# Patient Record
Sex: FEMALE | Race: WHITE | NOT HISPANIC OR LATINO | Employment: UNEMPLOYED | URBAN - METROPOLITAN AREA
[De-identification: names, ages, dates, MRNs, and addresses within clinical notes are randomized per-mention and may not be internally consistent; named-entity substitution may affect disease eponyms.]

---

## 2018-01-15 NOTE — PROGRESS NOTES
Message    Type of Encounter: Telephonic    Spoke to Patient  The reason for call is to discuss outreach for follow up/needed services, coordination of meeting care plan treatment goals and results  returned pt's phone call from 3/31/16 + preg test and [facility]  She had started her care with us for last pregnancy due date of 4/15 and ended up transferring to [facility]  I informed dr Jim Mcclure she suggested she go back to [facility]  She said she ended up with bad experience, since they promised a  and she ended up C/S   She was referred to Dr Fernando Wang to discuss and see if he will care for her          Signatures   Electronically signed by : KIP Guo ; 2016  3:24PM EST

## 2018-01-15 NOTE — PROGRESS NOTES
2016         RE: Michelle Bautista                           To: KIP Chavez    MR#: [id number]   : [address]: [id number]                             Fax: [phone]   (Exam #: [id number])      The LMP of this 32year old,  3, para 2 patient was 2015,   her working MARKY is SEP 29 2016 and the current gestational age is 12 weeks   1 day by [address] [place] [address]  A sonographic examination was performed on   2016 using real time equipment  The ultrasound examination was   performed using abdominal technique  The patient has a BMI of 24 6  Her   blood pressure today was 127/69  Earliest ultrasound found in her record: MFM 16  16w1d  MARKY 16      Thank you very much for your kind referral of Michelle Michele to the   [address] [place] [address] in [place] on 2016 for fetal ultrasound   evaluation and  assessment  Ave Camacho is a 20-year-old white female    3 para 3404 with uncertain menstrual dating  She has no   complaints  She denies vaginal bleeding  Her prenatal course has been   unremarkable so far  Jose A Mcdonald has a history of 2 prior  sections at term, the most   recent in 2015  Her first pregnancy was uncomplicated   prenatally  Her second pregnancy was complicated by diet-controlled   gestational diabetes  She delivered appropriately grown baby boys each   currently healthy  Daryl Coulter has an unremarkable past medical history, with   the exception of hypothyroidism, currently treated with Synthroid   replacement  Her past surgical history is otherwise negative  She takes no   other medication with the exception of a prenatal vitamin on a daily basis   and has no known drug allergy  Jose A Mcdonald denies tobacco, alcohol, or   illicit drug use during the pregnancy   The family medical history is   significant for her dad having hypertension, otherwise negative with   respect to first degree relatives with hypertension, diabetes, or venous   thromboembolism  The family genetic history is negative with respect to   genetic abnormalities, birth defects, or mental retardation  INDICATIONS      pregnancy dating      Exam Types      Level I      RESULTS      Fetus # 1 of 1   Transverse presentation   Fetal growth appeared normal   Placenta Location = Posterior   Complete placenta previa   Placenta Grade = 0      MEASUREMENTS (* Included In Average GA)      AC              10 3 cm        15 weeks 6 days* (56%)   BPD              3 3 cm        16 weeks 2 days*   HC              12 5 cm        16 weeks 1 day *   Femur            2 0 cm        16 weeks 0 days*      Tibia            1 8 cm        16 weeks 1 day   (46%)   Fibula           1 7 cm        15 weeks 4 days      Cerebellum       1 6 cm        16 weeks 6 days   CisternaMagna    3 2 mm      HC/AC           1 22   FL/AC           0 20   FL/BPD          0 61   EFW (Ac/Fl/Hc)   145 grams - 0 lbs 5 oz      THE AVERAGE GESTATIONAL AGE is 16 weeks 1 day +/- 7 days  AMNIOTIC FLUID      Largest Vertical Pocket = 3 0 cm   Amniotic Fluid: Normal      ANATOMY SUMMARY      The fetal cranium appeared normal in shape  Choroid plexus cysts are not   present  The lateral ventricles were not dilated and the midline   structures were not deviated  The cerebellum and cisterna magna were   visualized and appeared normal  The nuchal fold is not abnormally   thickened  The calvarium showed no evidence of defect, scalloping of the   parietal bones or abnormal shape  The cavum septum pellucidum appears   normal  There is no evidence of cleft lip or micrognathia  Anatomy of   the fetal thorax appeared within normal limits  The fetal diaphragm   appears intact  There were no intrathoracic masses noted or evidence of   pleural/pericardial effusion  The aortic arch appears normal  There was no   suspicion of tricuspid regurgitation   The cardiac size and structures   appeared sonographically normal at the four chamber view, and cardiac   rhythm was regular  There is no suspicion of fetal dysrhythmia  There was   no evidence of cardiomegaly, pericardial effusion or atrial/ventricular   disproportion  The interventricular septum appeared to be intact  There   is no suspicion of an echogenic intracardiac focus  The three vessel    tracheal view appears normal   The outflow tract views are normal  The   abdominal cavity appears normal  There is no evidence of fetal bowel   obstruction or abnormally echogenic bowel  Ascites is not present  The   fetal stomach appears normal in size and shape  The right kidney appears   normal  There is no suspicion of pyelectasis  Renal cysts are not   present  The echogenicity of the kidney is normal  The left kidney   appears normal   There is no suspicion of pyelectasis  The echogenicity   of the kidney is normal   renal cysts are not present  The fetal bladder   appears normal in size and shape  There is no suspicion of ureterocele  The abdominal wall appears intact  A normal abdominal cord insertion is   noted  The spine was visualized from cervical to sacral region, within the   resolution of the ultrasound equipment, without evidence of a neural tube   defect  or other malformation  Active movement of the extremities was seen   and fetal body motion was also observed during this examination  There was   no evidence of long bone abnormality   There is no evidence of a   subchorionic bleed  There is a 3 vessel cord with normal insertion site  The uterine contour appears normal  There is no suspicion of a uterine   myoma  ADNEXA      The left ovary appeared normal and measured 4 4 x 3 1 x 1 8 cm with a   volume of 12 8 cc  The right ovary appeared normal and measured 4 1 x 3 0   x 2 2 cm with a volume of 14 2 cc        UTERINE ARTERIES                                  S/D   PI    RI    NOTCH       Left Uterine Artery              0 93 No       Right Uterine Artery             2 23         Yes      IMPRESSION      Figueredo IUP   16 weeks and 1 day by this ultrasound  (MARKY=SEP 28 2016)   16 weeks and 1 day by 2nd Trimester Sono  (MARKY=SEP 28 2016)   Transverse presentation   Fetal growth appeared normal   Normal anatomy survey   Posterior placenta   Complete placenta previa      GENERAL COMMENT      No fetal structural abnormality or ultrasound marker for aneuploidy is   identified on the ultrasound study today in a limited evaluation secondary   to the early gestational age  The maternal uterine artery Doppler study is   abnormal  The amniotic fluid volume is normal  The estimated due date   based upon today's study is September 28, 2016  Placenta previa is present  Today's ultrasound findings and suggested follow-up were discussed in   detail with Chanell  The abnormal maternal uterine artery Doppler study   is associated with an increased risk for adverse pregnancy outcomes   related to abnormal placentation, including IUGR and preeclampsia  Daily   low dose aspirin therapy initiated at less than 16 weeks gestation is   associated with a significant reduction of these risks  I recommended   that Chanell initiate aspirin therapy this week  Darron Camarena will return to   the [facility]  in 4 weeks for level II ultrasound evaluation and   assessment of placental location  Given her history of prior gestational   diabetes, she has as much as a 50% likelihood for recurrence of GDM during   her current pregnancy  Screening should be performed soon, with repeat   evaluation between 24 and 28 weeks gestation, if initially negative  The face to face time, in addition to time spent discussing ultrasound   results, was 10 minutes, greater than 50% of which was spent during   counseling and coordination of care  DELFINO Dejesus M D     Maternal-Fetal Medicine   Electronically signed 04/14/16 14:06

## 2018-01-15 NOTE — PROGRESS NOTES
MAY 12 2016         RE: Lor Espino                           To: KIP Velasquez    MR#: [id number]   : [address]: [id number]                             Fax: [phone]   (Exam #: [id number])      The LMP of this 32year old,  G3, P2-0-0-2 patient was unknown, her   working MARKY is SEP 29 2016 and the current gestational age is 25 weeks 1   day by [address] [place] [address]  A sonographic examination was performed on MAY   12 2016 using real time equipment  The ultrasound examination was   performed using abdominal & vaginal techniques  The patient has a BMI of   24 7  Her blood pressure today was 121/77        Earliest ultrasound found in her record: MFM 16  16w1d  MARKY 16      Cardiac motion was observed at 141 bpm       INDICATIONS      fetal anatomical survey   abnormal uterine Doppler      Exam Types      LEVEL II   Transvaginal      RESULTS      Fetus # 1 of 1   Vertex presentation   Fetal growth appeared normal   Placenta Location = Posterior, low lying   No placenta previa   Placenta Grade = I      MEASUREMENTS (* Included In Average GA)      AC              14 1 cm        19 weeks 1 day * (35%)   BPD              4 4 cm        19 weeks 2 days* (34%)   HC              16 8 cm        19 weeks 3 days* (30%)   Femur            3 3 cm        20 weeks 3 days* (47%)      Nuchal Fold      4 0 mm      Humerus          3 1 cm        20 weeks 1 day   (58%)   Radius           2 6 cm        20 weeks 6 days   Ulna             3 0 cm        21 weeks 3 days   Tibia            2 8 cm        20 weeks 1 day   (47%)   Fibula           2 8 cm        19 weeks 4 days   Foot             3 3 cm        20 weeks 2 days      Cerebellum       2 0 cm        19 weeks 4 days   Biorbit          3 1 cm        19 weeks 6 days   CisternaMagna    6 0 mm      HC/AC           1 19   FL/AC           0 23   FL/BPD          0 74   EFW (Ac/Fl/Hc)   308 grams - 0 lbs 11 oz      THE AVERAGE GESTATIONAL AGE is 19 weeks 4 days +/- 10 days  AMNIOTIC FLUID         Largest Vertical Pocket = 3 9 cm   Amniotic Fluid: Normal      UTERINE ARTERIES                                  S/D   PI    RI    NOTCH       Left Uterine Artery        3 21  1 45  0 69       Right Uterine Artery       4 08  1 94  0 75      CERVICAL EVALUATION      The cervix appeared normal (Ultrasound Examination)  SUPINE      Cervical Length: 4 80 cm      OTHER TEST RESULTS           Funneling?: No             Dynamic Changes?: No        Resp  To TFP?: No      ANATOMY DETAILS      Visualized Appearing Sonographically Normal:   HEAD: (Calvarium, BPD Level, Lateral Ventricles, Choroid Plexus,   Cerebellum, Cisterna Magna);    FACE/NECK: (Neck, Nuchal Fold, Profile,   Orbits, Nose/Lips, Palate, Face);    TH  CAV : (Diaphragm); HEART: (3   Vessel Trachea, Four Chamber View, Proximal Left Outflow, Proximal Right   Outflow, Aortic Arch, Ductal Arch, Short Axis of Greater Vessels, Cardiac   Axis, Interventricular Septum, Interatrial Septum, Cardiac Position);      ABD  CAV , STOMACH, RIGHT KIDNEY, LEFT KIDNEY, BLADDER, ABD  WALL, SPINE:   (Cervical Spine, Thoracic Spine, Lumbar Spine, Sacrum);    EXTREMS: (Lt   Humerus, Rt Humerus, Lt Forearm, Rt Forearm, Lt Hand, Rt Hand, Lt Femur,   Rt Femur, Lt Low Leg, Rt Low Leg, Lt Foot, Rt Foot);    GENITALIA,   PLACENTA, UMBL  CORD, UTERUS, PCI      ADNEXA      The left ovary appeared normal and measured 4 1 x 2 5 x 2 8 cm with a   volume of 15 0 cc  The right ovary appeared normal and measured 3 9 x 4 4   x 2 5 cm with a volume of 22 4 cc        IMPRESSION      Figueredo IUP   19 weeks and 4 days by this ultrasound  (MARKY=OCT 2 2016)   20 weeks and 1 day by 2nd Trimester Sono  (MARKY=SEP 28 2016)   Vertex presentation   Fetal growth appeared normal   Normal anatomy survey   Regular fetal heart rate of 141 bpm   Posterior, low lying placenta   No placenta previa      GENERAL COMMENT      On exam today the patient appears well, in no acute distress, and denies   any complaints  Her abdomen is non-tender  The patient has not had genetic screening as she presented late to   prenatal care  She informs me that she may be interested in undergoing a   quad screen which I discussed with her can be performed up until 22 weeks   and the requisition can be obtained by the patient's obstetrician  The fetal anatomic survey is complete  There is no sonographic evidence   of fetal abnormalities at this time  Good fetal movement and tone are   seen  The amniotic fluid volume appears normal   The placenta is   posterior and is low-lying measuring 12 mm from the internal os  A   transvaginal ultrasound was performed to assess the cervix, which was not   seen well transabdominally  The cervical length was 4 8 centimeters,   which is normal for the current gestational age  There was no significant   funneling or dynamic changes appreciated  The patient was informed of   today's findings and all of her questions were answered  The limitations   of ultrasound were reviewed with the patient, which she accepts  Increased resistance indices are again appreciated in both uterine   arteries on today's ultrasound  This finding has been associated with   poor placental perfusion and intrauterine growth restriction  In light of   today's uterine artery findings, recommend continuation of low dose   aspirin therapy  A recent meta-analysis yielded risk reductions of 24%   for preeclampsia, 20% for intrauterine growth restriction, and 14% for    birth, with an absolute risk reduction of 2-5% for preeclampsia,   one to 5% for intrauterine growth restriction, and 2-4% for  birth  In this study, there was no identified risk of harm to the mother or   fetus but long-term evidence was somewhat limited    Given the overall   safety profile and risk-benefit analysis, I recommend an 81 mg aspirin be   taken everyday until approximately 35 weeks  Recommend the patient return in 8 weeks for a growth ultrasound for the   indication of abnormal uterine artery Dopplers and in order to check the   placenta location given the low-lying placenta  Please note, in addition to the time spent discussing the results of the   ultrasound, I spent approximately 10 minutes of face-to-face time with the   patient, greater than 50% of which was spent in counseling and the   coordination of care for this patient  Thank you very much for allowing us to participate in the care of this   very nice patient  Should you have any questions, please do not hesitate   to contact our office  DELFINO David M D     Electronically signed 05/12/16 13:45

## 2018-01-17 NOTE — PROGRESS NOTES
AUG 8 2016         RE: Vin Moore                           To: KIP Steel    MR#: [id number]   : [address]: [id number]                             Fax: [phone]   (Exam #: [id number])      The LMP of this 32year old,  G3, P2-0-0-2 patient was unknown, her   working MARKY is SEP 29 2016 and the current gestational age is 26 weeks 5   days by [address] [place] [address]  A sonographic examination was performed on AUG   8 2016 using real time equipment  The ultrasound examination was performed   using abdominal technique  The patient has a BMI of 26 4  Her blood   pressure today was 115/66  Earliest ultrasound found in her record: MFM 16  16w1d  MARKY 16      Cardiac motion was observed at 136 bpm       INDICATIONS      Interval growth assesment   abnormal uterine Doppler   diabetes, gestational, A1      Exam Types      Level I      RESULTS      Fetus # 1 of 1   Vertex presentation   Fetal growth appeared normal   Placenta Location = Posterior, left lateral   No placenta previa   Placenta Grade = II      MEASUREMENTS (* Included In Average GA)      AC              27 6 cm        31 weeks 5 days* (29%)   BPD              8 0 cm        32 weeks 1 day * (33%)   HC              29 3 cm        32 weeks 0 days* (21%)   Femur            6 4 cm        32 weeks 6 days* (55%)      Cerebellum       4 1 cm        34 weeks 1 day      HC/AC           1 06   FL/AC           0 23   FL/BPD          0 80   EFW (Ac/Fl/Hc)  1917 grams - 4 lbs 4 oz                 (36%)      THE AVERAGE GESTATIONAL AGE is 32 weeks 1 day +/- 18 days        AMNIOTIC FLUID      Q1: 5 7      Q2: 4 9      Q3: 5 8      Q4: 4 4   HEATHER Total = 20 7 cm   Amniotic Fluid: Normal      ANATOMY DETAILS      Visualized Appearing Sonographically Normal:   HEAD: (Calvarium, BPD Level, Cavum, Lateral Ventricles, Choroid Plexus,   Cerebellum, Cisterna Magna);    TH  CAV : (Diaphragm);    ABD  CAV ,   STOMACH, RIGHT KIDNEY, LEFT KIDNEY, BLADDER, PLACENTA      Not Visualized:   HEART: (Four Chamber View, Proximal Left Outflow, Proximal Right Outflow,   Cardiac Axis, Cardiac Position)      IMPRESSION      Figueredo IUP   32 weeks and 1 day by this ultrasound  (MARKY=OCT 2 2016)   32 weeks and 5 days by 2nd Trimester Sono  (MARKY=SEP 28 2016)   Vertex presentation   Fetal growth appeared normal   Regular fetal heart rate of 136 bpm   Posterior, left lateral placenta   No placenta previa      GENERAL COMMENT      On exam today the patient appears well, in no acute distress, and denies   any complaints  Her abdomen is non-tender  There has been appropriate interval fetal growth  Good fetal movement and   tone are seen  The amniotic fluid volume appears normal   The placenta is   [default value]and it appears sonographically normal   The anatomic   structures listed above could not be optimally imaged today because of   fetal position; however, these structures were seen on a prior ultrasound   and appeared sonographically normal   The patient was informed of today's   findings and all of her questions were answered  The limitations of   ultrasound were reviewed with the patient   labor precautions and   fetal kick counts were reviewed  The patient is an A1 gestational diabetic   surveillance is not   required for this group of patients as long as there is appropriate fetal   growth and normal glucose values  The patient was informed of today's   findings and all of her questions were answered  Recommend that the   patient be delivered by 41  weeks gestation; however, if delivery is   delayed beyond 40 weeks gestation,  testing should be initiated   at that time  We discussed follow-up in detail and I recommend the patient return in 4   weeks for a growth ultrasound  Thank you very much for allowing us to participate in the care of this   very nice patient    Should you have any questions, please do not hesitate   to contact our office  Please note, in addition to the time spent discussing the results of the   ultrasound, I spent approximately 10 minutes of face-to-face time with the   patient, greater than 50% of which was spent in counseling and the   coordination of care for this patient  DELFINO Lemos M D     Electronically signed 08/08/16 14:47

## 2018-01-17 NOTE — PROGRESS NOTES
2016         RE: Yanet Kirk                           To: Shawnee Zhong, M D    MR#: [id number]   : [address]: [id number]                             Fax: [phone]   (Exam #: [id number])      The LMP of this 32year old,  G3, P2-0-0-2 patient was unknown, her   working MARKY is SEP 29 2016 and the current gestational age is 35 weeks 1   day by [address] [place] [address] [address]  A sonographic examination was performed on 2016 using real time equipment  The ultrasound examination was performed   using abdominal & vaginal techniques  The patient has a BMI of 24 9  Her   blood pressure today was 114/75  Earliest ultrasound found in her record: MFM 16  16w1d  MARKY 16      Cardiac motion was observed at 138 bpm       INDICATIONS      Interval growth assesment   abnormal uterine Doppler   low lying placenta      Exam Types      Level I   Transvaginal      RESULTS      Fetus # 1 of 1   Vertex presentation   Fetal growth appeared normal   Placenta Location = Posterior   No placenta previa   Placenta Grade = II      MEASUREMENTS (* Included In Average GA)      AC              22 7 cm        27 weeks 0 days* (29%)   BPD              6 8 cm        27 weeks 3 days* (27%)   HC              25 4 cm        27 weeks 2 days* (22%)   Femur            5 4 cm        28 weeks 6 days* (52%)      Cerebellum       3 2 cm        28 weeks 4 days      HC/AC           1 12   FL/AC           0 24   FL/BPD          0 80   EFW (Ac/Fl/Hc)  1119 grams - 2 lbs 7 oz                 (43%)      THE AVERAGE GESTATIONAL AGE is 27 weeks 4 days +/- 14 days  AMNIOTIC FLUID      Q1: 2 2      Q2: 3 5      Q3: 3 3      Q4: 3 7   HEATHER Total = 12 6 cm   Amniotic Fluid: Normal      CERVICAL EVALUATION      The cervix appeared normal (Ultrasound Examination)  SUPINE      Cervical Length: 4 28 cm      OTHER TEST RESULTS           Funneling?: No             Dynamic Changes?: No        Resp   To TFP?: No ANATOMY      Head                                    Normal   Th  Cav  Normal   Heart                                   See Details   Abd  Cav  Normal   Stomach                                 Normal   Right Kidney                            Normal   Left Kidney                             Normal   Bladder                                 Normal   Placenta                                Normal      ANATOMY DETAILS      Visualized Appearing Sonographically Normal:   HEAD: (Calvarium, BPD Level, Cavum, Lateral Ventricles, Choroid Plexus,   Cerebellum, Cisterna Magna);    TH  CAV : (Diaphragm); HEART: (Four   Chamber View, Proximal Left Outflow, Proximal Right Outflow, 3 Vessel   Trachea, Short Axis of Greater Vessels, Interventricular Septum,   Interatrial Septum, Cardiac Axis, Cardiac Position);    ABD  CAV ,   STOMACH, RIGHT KIDNEY, LEFT KIDNEY, BLADDER, PLACENTA      IMPRESSION      Figueredo IUP   27 weeks and 4 days by this ultrasound  (MARKY=OCT 2 2016)   28 weeks and 1 day by 2nd Trimester Sono  (MARKY=SEP 28 2016)   Vertex presentation   Fetal growth appeared normal   Regular fetal heart rate of 138 bpm   Posterior placenta   No placenta previa      GENERAL COMMENT      On exam today the patient appears well, in no acute distress, and denies   any complaints  Her abdomen is non-tender  There has been appropriate interval fetal growth  Good fetal movement and   tone are seen  The amniotic fluid volume appears normal   The placenta is   posterior and it appears sonographically normal and is no longer low-lying   confirmed by transvaginal imaging     The patient was informed of today's   findings and all of her questions were answered  The limitations of   ultrasound were reviewed with the patient   labor precautions and   fetal kick counts were reviewed        We discussed follow-up in detail and the patient will return in   approximately 6 weeks for a fetal growth ultrasound for the indication of   abnormal second trimester uterine artery Dopplers  Thank you very much for allowing us to participate in the care of this   very nice patient  Should you have any questions, please do not hesitate   to contact our office  Please note, in addition to the time spent discussing the results of the   ultrasound, I spent approximately 10 minutes of face-to-face time with the   patient, greater than 50% of which was spent in counseling and the   coordination of care for this patient  DELFINO Keith M D     Electronically signed 07/07/16 13:25

## 2018-01-18 NOTE — PROGRESS NOTES
SEP 7 2016         RE: Mitchcruz Levyon                           To: [facility]   MR#: [id number]                                    [address] [facility], [address] [place] [address]   : [address] [facility] [address]   Leonila De La Rosa: [phone]                             Fax: [phone]   (Exam #: [id number])      The LMP of this 32year old,  G3, P2-0-0-2 patient was unknown, her   working MARKY is SEP 29 2016 and the current gestational age is 42 weeks 0   days by [address] [place] [address] [address]  A sonographic examination was performed on SEP   7 2016 using real time equipment  The ultrasound examination was performed   using abdominal technique  The patient has a BMI of 26 7  Her blood   pressure today was 128/75  Earliest ultrasound found in her record: MFM 16  16w1d  MARKY 16      Cardiac motion was observed at 157 bpm       INDICATIONS      abnormal uterine Doppler   diabetes, gestational, A1   fetal growth   Previous C/S x 2      Exam Types      Level I      RESULTS      Fetus # 1 of 1   Vertex presentation   Fetal growth appeared normal   Placenta Location = Posterior   No placenta previa   Placenta Grade = II      MEASUREMENTS (* Included In Average GA)      AC              32 2 cm        36 weeks 1 day * (41%)   BPD              9 1 cm        36 weeks 6 days* (57%)   HC              32 3 cm        35 weeks 6 days* (27%)   Femur            7 1 cm        35 weeks 6 days* (40%)      Humerus          6 3 cm        36 weeks 4 days  (60%)      Cerebellum       5 0 cm        36 weeks 4 days      HC/AC           1 00   FL/AC           0 22   FL/BPD          0 78   EFW (Ac/Fl/Hc)  2854 grams - 6 lbs 5 oz                 (34%)      THE AVERAGE GESTATIONAL AGE is 36 weeks 1 day +/- 21 days        AMNIOTIC FLUID      Q1: 4 0      Q2: 4 7      Q3: 6 8      Q4: 4 1   HEATHER Total = 19 6 cm   Amniotic Fluid: Normal      ANATOMY DETAILS      Visualized Appearing Sonographically Normal: HEAD: (Calvarium, BPD Level, Lateral Ventricles, Cerebellum); HEART:   (Four Chamber View, Proximal Left Outflow, Proximal Right Outflow, 3   Vessel Trachea, Interventricular Septum, Cardiac Axis, Cardiac Position);      STOMACH, RIGHT KIDNEY, LEFT KIDNEY, BLADDER, GENITALIA (Female), PLACENTA      BIOPHYSICAL PROFILE      The Biophysical Profile score was 8/8  Breathin  Movement: 2  Tone: 2  AFV: 2      IMPRESSION      Figueredo IUP   36 weeks and 1 day by this ultrasound  (MARKY=OCT 4 2016)   37 weeks and 0 days by 2nd Trimester Sono  (MARKY=SEP 28 2016)   Vertex presentation   Fetal growth appeared normal   Regular fetal heart rate of 157 bpm   Posterior placenta   No placenta previa      GENERAL COMMENT      On exam today the patient appears well, in no acute distress, and denies   any complaints  Her abdomen is non-tender  There has been appropriate interval fetal growth  Good fetal movement and   tone are seen  The amniotic fluid volume appears normal   The placenta is   posterior and it appears sonographically normal   The patient was informed   of today's findings and all of her questions were answered  The   limitations of ultrasound were reviewed with the patient   labor   precautions and fetal kick counts were reviewed  Diana is no longer following with our diabetes in pregnancy program     She was not happy with our recommendation to consider starting insulin and   also transferred her care to a different obstetrician  She states that   the recommendation was to consider an oral hypoglycemic agent prior to   starting insulin  I reviewed her blood sugars with her and they appear to   be well controlled and she does not appear to need intervention at this   time    In general, insulin is much safer than oral hypoglycemics and also   much more effective which is the reason to utilize insulin if more than   30% of her blood sugars are abnormal       I advised the patient to discontinue her low dose aspirin therapy  Recommend the patient return as clinically indicated  Thank you very much for allowing us to participate in the care of this   very nice patient  Should you have any questions, please do not hesitate   to contact our office  Please note, in addition to the time spent discussing the results of the   ultrasound, I spent approximately 10 minutes of face-to-face time with the   patient, greater than 50% of which was spent in counseling and the   coordination of care for this patient  DELFINO Odell M D     Electronically signed 09/07/16 16:17

## 2019-04-09 ENCOUNTER — APPOINTMENT (EMERGENCY)
Dept: RADIOLOGY | Facility: HOSPITAL | Age: 34
End: 2019-04-09
Payer: COMMERCIAL

## 2019-04-09 ENCOUNTER — HOSPITAL ENCOUNTER (EMERGENCY)
Facility: HOSPITAL | Age: 34
Discharge: HOME | End: 2019-04-09
Attending: EMERGENCY MEDICINE | Admitting: EMERGENCY MEDICINE
Payer: COMMERCIAL

## 2019-04-09 VITALS
WEIGHT: 120 LBS | TEMPERATURE: 97 F | HEART RATE: 79 BPM | OXYGEN SATURATION: 100 % | HEIGHT: 63 IN | DIASTOLIC BLOOD PRESSURE: 64 MMHG | SYSTOLIC BLOOD PRESSURE: 132 MMHG | BODY MASS INDEX: 21.26 KG/M2 | RESPIRATION RATE: 28 BRPM

## 2019-04-09 DIAGNOSIS — R14.1 GAS PAIN: ICD-10-CM

## 2019-04-09 DIAGNOSIS — R10.9 ABDOMINAL PAIN: Primary | ICD-10-CM

## 2019-04-09 LAB
ALBUMIN SERPL BCP-MCNC: 4.5 G/DL (ref 3.5–5)
ALP SERPL-CCNC: 65 U/L (ref 46–116)
ALT SERPL W P-5'-P-CCNC: 65 U/L (ref 12–78)
ANION GAP SERPL CALCULATED.3IONS-SCNC: 15 MMOL/L (ref 4–13)
AST SERPL W P-5'-P-CCNC: 27 U/L (ref 5–45)
BASOPHILS # BLD AUTO: 0.06 THOUSANDS/ΜL (ref 0–0.1)
BASOPHILS NFR BLD AUTO: 0 % (ref 0–1)
BILIRUB SERPL-MCNC: 0.3 MG/DL (ref 0.2–1)
BILIRUB UR QL STRIP: NEGATIVE
BUN SERPL-MCNC: 10 MG/DL (ref 5–25)
CALCIUM SERPL-MCNC: 9.8 MG/DL (ref 8.3–10.1)
CHLORIDE SERPL-SCNC: 99 MMOL/L (ref 100–108)
CLARITY UR: CLEAR
CO2 SERPL-SCNC: 23 MMOL/L (ref 21–32)
COLOR UR: YELLOW
CREAT SERPL-MCNC: 0.82 MG/DL (ref 0.6–1.3)
EOSINOPHIL # BLD AUTO: 0.09 THOUSAND/ΜL (ref 0–0.61)
EOSINOPHIL NFR BLD AUTO: 1 % (ref 0–6)
ERYTHROCYTE [DISTWIDTH] IN BLOOD BY AUTOMATED COUNT: 13 % (ref 11.6–15.1)
EXT PREG TEST URINE: NEGATIVE
GFR SERPL CREATININE-BSD FRML MDRD: 94 ML/MIN/1.73SQ M
GLUCOSE SERPL-MCNC: 120 MG/DL (ref 65–140)
GLUCOSE UR STRIP-MCNC: NEGATIVE MG/DL
HCT VFR BLD AUTO: 42 % (ref 34.8–46.1)
HGB BLD-MCNC: 13.6 G/DL (ref 11.5–15.4)
HGB UR QL STRIP.AUTO: NEGATIVE
IMM GRANULOCYTES # BLD AUTO: 0.05 THOUSAND/UL (ref 0–0.2)
IMM GRANULOCYTES NFR BLD AUTO: 0 % (ref 0–2)
KETONES UR STRIP-MCNC: NEGATIVE MG/DL
LEUKOCYTE ESTERASE UR QL STRIP: NEGATIVE
LYMPHOCYTES # BLD AUTO: 3.21 THOUSANDS/ΜL (ref 0.6–4.47)
LYMPHOCYTES NFR BLD AUTO: 20 % (ref 14–44)
MCH RBC QN AUTO: 29.6 PG (ref 26.8–34.3)
MCHC RBC AUTO-ENTMCNC: 32.4 G/DL (ref 31.4–37.4)
MCV RBC AUTO: 92 FL (ref 82–98)
MONOCYTES # BLD AUTO: 1.24 THOUSAND/ΜL (ref 0.17–1.22)
MONOCYTES NFR BLD AUTO: 8 % (ref 4–12)
NEUTROPHILS # BLD AUTO: 11.28 THOUSANDS/ΜL (ref 1.85–7.62)
NEUTS SEG NFR BLD AUTO: 71 % (ref 43–75)
NITRITE UR QL STRIP: NEGATIVE
NRBC BLD AUTO-RTO: 0 /100 WBCS
PH UR STRIP.AUTO: 8.5 [PH]
PLATELET # BLD AUTO: 396 THOUSANDS/UL (ref 149–390)
PMV BLD AUTO: 8.5 FL (ref 8.9–12.7)
POTASSIUM SERPL-SCNC: 3.9 MMOL/L (ref 3.5–5.3)
PROT SERPL-MCNC: 8.1 G/DL (ref 6.4–8.2)
PROT UR STRIP-MCNC: NEGATIVE MG/DL
RBC # BLD AUTO: 4.59 MILLION/UL (ref 3.81–5.12)
SODIUM SERPL-SCNC: 137 MMOL/L (ref 136–145)
SP GR UR STRIP.AUTO: 1.01 (ref 1–1.03)
UROBILINOGEN UR QL STRIP.AUTO: 0.2 E.U./DL
WBC # BLD AUTO: 15.93 THOUSAND/UL (ref 4.31–10.16)

## 2019-04-09 PROCEDURE — 74176 CT ABD & PELVIS W/O CONTRAST: CPT

## 2019-04-09 PROCEDURE — 85025 COMPLETE CBC W/AUTO DIFF WBC: CPT | Performed by: EMERGENCY MEDICINE

## 2019-04-09 PROCEDURE — 81003 URINALYSIS AUTO W/O SCOPE: CPT | Performed by: EMERGENCY MEDICINE

## 2019-04-09 PROCEDURE — 81025 URINE PREGNANCY TEST: CPT | Performed by: EMERGENCY MEDICINE

## 2019-04-09 PROCEDURE — 80053 COMPREHEN METABOLIC PANEL: CPT | Performed by: EMERGENCY MEDICINE

## 2019-04-09 PROCEDURE — 36415 COLL VENOUS BLD VENIPUNCTURE: CPT | Performed by: EMERGENCY MEDICINE

## 2019-04-09 PROCEDURE — 99284 EMERGENCY DEPT VISIT MOD MDM: CPT

## 2019-04-09 RX ORDER — LEVOTHYROXINE SODIUM 0.1 MG/1
100 TABLET ORAL DAILY
COMMUNITY

## 2020-01-04 ENCOUNTER — HOSPITAL ENCOUNTER (EMERGENCY)
Facility: HOSPITAL | Age: 35
Discharge: HOME | End: 2020-01-04
Attending: EMERGENCY MEDICINE
Payer: COMMERCIAL

## 2020-01-04 VITALS
WEIGHT: 130 LBS | SYSTOLIC BLOOD PRESSURE: 113 MMHG | RESPIRATION RATE: 18 BRPM | BODY MASS INDEX: 23.03 KG/M2 | DIASTOLIC BLOOD PRESSURE: 78 MMHG | TEMPERATURE: 98.4 F | OXYGEN SATURATION: 98 % | HEART RATE: 117 BPM

## 2020-01-04 DIAGNOSIS — J11.1 INFLUENZA-LIKE ILLNESS: Primary | ICD-10-CM

## 2020-01-04 PROCEDURE — 99282 EMERGENCY DEPT VISIT SF MDM: CPT | Performed by: PHYSICIAN ASSISTANT

## 2020-01-04 PROCEDURE — 99283 EMERGENCY DEPT VISIT LOW MDM: CPT

## 2020-01-04 NOTE — ED PROVIDER NOTES
History  Chief Complaint   Patient presents with    Cough     head congestion and cough for 3 days     42-year-old female history of ovarian cysts presents with flu-like symptoms x3 days  She states that she does not get her flu shot  Everybody in her house is sick similar symptoms  She states she has headache, sinus pressure, sinus pain, nasal congestion, cough  She has been taking over-the-counter cough medicine with some relief  No fevers or chills  No chest pain, difficulty breathing, shortness of breath  No wheezing  No change in appetite  No sore throat  No ear pain  No rashes  Prior to Admission Medications   Prescriptions Last Dose Informant Patient Reported? Taking? Etonogestrel (NEXPLANON SC) Unknown at Unknown time  Yes No   Sig: Inject under the skin   levothyroxine 100 mcg tablet 1/3/2020 at Unknown time  Yes Yes   Sig: Take 100 mcg by mouth daily      Facility-Administered Medications: None       Past Medical History:   Diagnosis Date    Ovarian cyst        Past Surgical History:   Procedure Laterality Date     SECTION         History reviewed  No pertinent family history  I have reviewed and agree with the history as documented  Social History     Tobacco Use    Smoking status: Current Every Day Smoker     Packs/day: 0 50    Smokeless tobacco: Never Used   Substance Use Topics    Alcohol use: Yes     Frequency: Never     Comment: occassional    Drug use: Yes     Frequency: 1 0 times per week     Types: Marijuana        Review of Systems   Constitutional: Negative for chills and fever  HENT: Positive for congestion, rhinorrhea, sinus pressure and sinus pain  Negative for ear pain, sneezing and sore throat  Respiratory: Negative for cough and shortness of breath  Cardiovascular: Negative for chest pain, palpitations and leg swelling  Gastrointestinal: Negative for abdominal pain, constipation, diarrhea, nausea and vomiting     Musculoskeletal: Negative for back pain, gait problem and joint swelling  Skin: Negative for color change, pallor, rash and wound  Neurological: Positive for headaches  Negative for dizziness, syncope, weakness, light-headedness and numbness  All other systems reviewed and are negative  Physical Exam  Physical Exam   Constitutional: She appears well-developed and well-nourished  No distress  Patient well-appearing, no acute respiratory distress  HENT:   Head: Normocephalic and atraumatic  Right Ear: Hearing, tympanic membrane, external ear and ear canal normal  Tympanic membrane is not perforated, not erythematous, not retracted and not bulging  Left Ear: Hearing, tympanic membrane, external ear and ear canal normal  Tympanic membrane is not perforated, not erythematous, not retracted and not bulging  Nose: Right sinus exhibits maxillary sinus tenderness  Left sinus exhibits maxillary sinus tenderness  Mouth/Throat: Uvula is midline, oropharynx is clear and moist and mucous membranes are normal  No trismus in the jaw  No uvula swelling  No oropharyngeal exudate, posterior oropharyngeal edema, posterior oropharyngeal erythema or tonsillar abscesses  Eyes: EOM are normal    Neck: Normal range of motion  Neck supple  Cardiovascular: Normal rate, regular rhythm, normal heart sounds and intact distal pulses  Exam reveals no gallop and no friction rub  No murmur heard  Pulmonary/Chest: Effort normal and breath sounds normal  No stridor  No respiratory distress  She has no wheezes  She has no rales  Sp02 is 98% indicating adequate oxygenation on room air   Abdominal: Soft  Bowel sounds are normal  She exhibits no distension and no mass  There is no tenderness  There is no guarding  Abdomen soft, nontender, nondistended  No peritoneal signs   Skin: Skin is warm and dry  Capillary refill takes less than 2 seconds  No rash noted  She is not diaphoretic  No erythema  No pallor     No rashes   Nursing note and vitals reviewed  Vital Signs  ED Triage Vitals [01/04/20 0956]   Temperature Pulse Respirations Blood Pressure SpO2   98 4 °F (36 9 °C) (!) 117 18 113/78 98 %      Temp Source Heart Rate Source Patient Position - Orthostatic VS BP Location FiO2 (%)   Tympanic Monitor Sitting Right arm --      Pain Score       8           Vitals:    01/04/20 0956   BP: 113/78   Pulse: (!) 117   Patient Position - Orthostatic VS: Sitting         Visual Acuity      ED Medications  Medications - No data to display    Diagnostic Studies  Results Reviewed     None                 No orders to display              Procedures  Procedures         ED Course                               MDM  Number of Diagnoses or Management Options  Influenza-like illness:   Diagnosis management comments: Patient well-appearing, afebrile, in no acute respiratory distress with flu-like symptoms  Encouraged supportive treatment, increased hydration, frequent hand washing, netti pot for sinus pressure/pain  Educated how to alternate between Tylenol and Motrin as needed for any fevers  Gave patient proper education regarding diagnosis  Answered all questions  Return to ED for any worsening of symptoms otherwise follow up with primary care physician for re-evaluation  Discussed plan with patient who verbalized understanding and agreed to plan  Amount and/or Complexity of Data Reviewed  Review and summarize past medical records: yes  Discuss the patient with other providers: yes          Disposition  Final diagnoses:   Influenza-like illness     Time reflects when diagnosis was documented in both MDM as applicable and the Disposition within this note     Time User Action Codes Description Comment    1/4/2020 10:24 AM Adry Kim [address] Influenza-like illness       ED Disposition     ED Disposition Condition Date/Time Comment    Discharge Stable Sat Jan 4, 2020 10:24 AM Marvin Thompson discharge to home/self care              Follow-up Information Follow up With Specialties Details Why Contact Info Additional Information    Amee Colbert, DO Family Medicine Schedule an appointment as soon as possible for a visit in 3 days If symptoms worsen [facility] [address]  [phone] [address] [facility] Emergency Department Emergency Medicine Go to  As needed [address] [place] [address]  [phone] [facility], [place], [place], [address]          Patient's Medications   Discharge Prescriptions    No medications on file     No discharge procedures on file      ED Provider  Electronically Signed by           Betzaida Magallon PA-C  01/04/20 1049

## 2020-07-18 ENCOUNTER — APPOINTMENT (EMERGENCY)
Dept: RADIOLOGY | Facility: HOSPITAL | Age: 35
End: 2020-07-18
Payer: COMMERCIAL

## 2020-07-18 ENCOUNTER — HOSPITAL ENCOUNTER (EMERGENCY)
Facility: HOSPITAL | Age: 35
Discharge: HOME | End: 2020-07-18
Attending: EMERGENCY MEDICINE | Admitting: EMERGENCY MEDICINE
Payer: COMMERCIAL

## 2020-07-18 VITALS
OXYGEN SATURATION: 100 % | DIASTOLIC BLOOD PRESSURE: 77 MMHG | RESPIRATION RATE: 32 BRPM | BODY MASS INDEX: 25.19 KG/M2 | HEART RATE: 91 BPM | SYSTOLIC BLOOD PRESSURE: 141 MMHG | WEIGHT: 142.2 LBS | TEMPERATURE: 97.7 F

## 2020-07-18 DIAGNOSIS — S93.409A ANKLE SPRAIN: Primary | ICD-10-CM

## 2020-07-18 PROCEDURE — 73610 X-RAY EXAM OF ANKLE: CPT

## 2020-07-18 PROCEDURE — 99284 EMERGENCY DEPT VISIT MOD MDM: CPT

## 2020-07-18 PROCEDURE — 90715 TDAP VACCINE 7 YRS/> IM: CPT | Performed by: EMERGENCY MEDICINE

## 2020-07-18 PROCEDURE — 90471 IMMUNIZATION ADMIN: CPT

## 2020-07-18 PROCEDURE — 99284 EMERGENCY DEPT VISIT MOD MDM: CPT | Performed by: EMERGENCY MEDICINE

## 2020-07-18 RX ORDER — OXYCODONE HYDROCHLORIDE AND ACETAMINOPHEN 5; 325 MG/1; MG/1
1 TABLET ORAL ONCE
Status: DISCONTINUED | OUTPATIENT
Start: 2020-07-18 | End: 2020-07-19 | Stop reason: HOSPADM

## 2020-07-18 RX ORDER — GINSENG 100 MG
1 CAPSULE ORAL ONCE
Status: COMPLETED | OUTPATIENT
Start: 2020-07-18 | End: 2020-07-18

## 2020-07-18 RX ADMIN — BACITRACIN ZINC 1 LARGE APPLICATION: 500 OINTMENT TOPICAL at 22:18

## 2020-07-18 RX ADMIN — TETANUS TOXOID, REDUCED DIPHTHERIA TOXOID AND ACELLULAR PERTUSSIS VACCINE, ADSORBED 0.5 ML: 5; 2.5; 8; 8; 2.5 SUSPENSION INTRAMUSCULAR at 22:16
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
## 2020-07-24 ENCOUNTER — OFFICE VISIT (OUTPATIENT)
Dept: OBGYN CLINIC | Facility: CLINIC | Age: 35
End: 2020-07-24
Payer: COMMERCIAL

## 2020-07-24 VITALS — HEART RATE: 102 BPM | TEMPERATURE: 96.2 F | DIASTOLIC BLOOD PRESSURE: 92 MMHG | SYSTOLIC BLOOD PRESSURE: 134 MMHG

## 2020-07-24 DIAGNOSIS — S93.402A SPRAIN OF LEFT ANKLE, UNSPECIFIED LIGAMENT, INITIAL ENCOUNTER: Primary | ICD-10-CM

## 2020-07-24 PROCEDURE — 99204 OFFICE O/P NEW MOD 45 MIN: CPT | Performed by: ORTHOPAEDIC SURGERY
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
## 2020-08-05 ENCOUNTER — EVALUATION (OUTPATIENT)
Dept: PHYSICAL THERAPY | Facility: CLINIC | Age: 35
End: 2020-08-05
Payer: COMMERCIAL

## 2020-08-05 DIAGNOSIS — S93.402D SPRAIN OF LEFT ANKLE, UNSPECIFIED LIGAMENT, SUBSEQUENT ENCOUNTER: Primary | ICD-10-CM

## 2020-08-05 DIAGNOSIS — S93.402A SPRAIN OF LEFT ANKLE, UNSPECIFIED LIGAMENT, INITIAL ENCOUNTER: ICD-10-CM

## 2020-08-05 PROCEDURE — 97161 PT EVAL LOW COMPLEX 20 MIN: CPT

## 2020-08-05 PROCEDURE — 97110 THERAPEUTIC EXERCISES: CPT

## 2020-08-05 NOTE — PROGRESS NOTES
PT Evaluation     Today's date: 2020  Patient name: Marlis Nissen  : 1985  MRN: [id number]  Referring provider: Khloe Quinteros MD  Dx:   Encounter Diagnosis     ICD-10-CM    1  Sprain of left ankle, unspecified ligament, subsequent encounter  S93 402D                   Assessment  Assessment details: Marlis Nissen is a 28 y o  female who presents with pain, decreased strength, decreased ROM, decreased joint mobility, joint effusion and ambulatory dysfunction  Due to these impairments, patient has difficulty performing ADL's, recreational activities, engaging in social activities, ambulation, stair negotiation  Patient's clinical presentation is consistent with their referring diagnosis of Sprain of left ankle, unspecified ligament, subsequent encounter  (primary encounter diagnosis)    Patient has been educated in home exercise program and plan of care   Patient would benefit from skilled physical therapy services to address their aforementioned functional limitations and progress towards prior level of function and independence with home exercise program      Impairments: abnormal gait, abnormal or restricted ROM, activity intolerance, impaired physical strength, lacks appropriate home exercise program, pain with function, weight-bearing intolerance and poor body mechanics    Goals  Short Term Goals to be accomplished in 4 weeks:  STG1: Pt will be I with HEP  STG2: Pt will demo 50% inc in ankle AROM  STG3: Pt will demo 1/2 MMT strength in ankle   STG4: Pt will amb community distance without gait deviates due to pain     Long Term Goals to be accomplished in 12 weeks:   LTG1: Pt will demo ankle strength WNL to return to PLOF  LTG2: Pt will demo ankle AROM WNL to minimize gait deviations  LTG3: Pt will return to household ADLs and work duties pain free as per [employer]      Plan  Plan details: HEP development, stretching, strengthening, A/AA/PROM, joint mobilizations, posture education, STM/MI as needed to reduce muscle tension, muscle reeducation, PLOC discussed and agreed upon with patient  Patient would benefit from: PT eval and skilled physical therapy  Planned modality interventions: cryotherapy and thermotherapy: hydrocollator packs  Planned therapy interventions: manual therapy, neuromuscular re-education, self care, therapeutic activities, therapeutic exercise and home exercise program  Frequency: 2x week  Plan of Care beginning date: 2020  Plan of Care expiration date: 10/28/2020  Treatment plan discussed with: patient        Subjective Evaluation    History of Present Illness  Mechanism of injury: Pt presents w/ c/c of L ankle pain secondary to jumping off a golf cart and twisted her ankle  Pt initially felt severe pain and swelling  She went to the hospital the day of injury and was placed in a type of cast  Pt then saw Dr Omkar Senior one week later and was placed in CAM boot  Xray was negative for fracture  She feels she was improving until Friday when she was walking down the stairs and stepped off wrong this past week which re-irritated her ankle        Quality of life: excellent    Pain  Current pain ratin  At best pain ratin  At worst pain rating: 10  Location: general ankle pain   Quality: tight, pulling, dull ache and discomfort  Relieving factors: ice and rest  Aggravating factors: walking, standing and stair climbing  Progression: improved    Social Support  Stairs in house: yes   12  Lives in: multiple-level home    Employment status: not working  Hand dominance: right      Diagnostic Tests  X-ray: normal  Treatments  Previous treatment: immobilization  Patient Goals  Patient goals for therapy: decreased pain, increased motion, return to sport/leisure activities, independence with ADLs/IADLs and increased strength          Objective    AROM:    R  L  Ankle DF calc/5thray  10  5*  Ankle PF calc/5thray  35  10*  Ankle inversion  30  15*  Ankle eversion  25  15*    STRENGTH: R  L    Ankle DF   5/5  3/5*  Ankle PF     3*  Ankle inversion  5/5  3/5*  Ankle eversoin  5/5  3/5*    Knee ext     4+/5  Knee flex     Hip abduction     Hip extension         Flexibility:   (+)Tightness gastroc/soleus, hamstring    Joint Mobility:   Hypomobility noted talocrural jt  Hypomobility noted subtalar jt    Tenderness/Palpation:  (+) TTP ATFL, CFL, deltoid ligaments, anterior ankle joint    Observation: Moderate edema noted t/o med/lat ankle jt as well as t/o foot   Ecchymosis and bruising noted t/o med/lat ankle and foot     Balance: Not assessed    Function:  Gait: Antalgic, decreased stance time on LLE  Step up/down: Pain, use of HR, reciprocal with CAM boot, unable without boot          Precautions: Standard  CAM boot  Date             Visit # 1            Manuals                                       Neuro Re-Ed                                                                                                        Ther Ex             Ankle AROM HEP            Ankle circles HEP            Gastroc str SOS HEP                                                                             Ther Activity                                       Gait Training                                       Modalities             CP 10'                             Skin intact pre/post CP  Access Code: [email or web address]   URL: [employer]  com/   Date: 2020   Prepared by: Yvonne Apgar     Exercises   Seated Calf Towel Stretch - 3 reps - 15 hold - 2x daily - 7x weekly   Seated Ankle Inversion Eversion AROM - 10 reps - 2 sets - 2x daily - 7x weekly   Seated Ankle Pumps - 10 reps - 2 sets - 2x daily - 7x weekly   Seated Ankle Circles - 10 reps - 2 sets - 2x daily - 7x weekly

## 2020-08-07 ENCOUNTER — APPOINTMENT (OUTPATIENT)
Dept: PHYSICAL THERAPY | Facility: CLINIC | Age: 35
End: 2020-08-07
Payer: COMMERCIAL

## 2020-08-07 ENCOUNTER — TELEPHONE (OUTPATIENT)
Dept: PHYSICAL THERAPY | Facility: CLINIC | Age: 35
End: 2020-08-07

## 2020-08-07 NOTE — TELEPHONE ENCOUNTER
Pt contacted clinic to 1 hour before session to cancel appointment  States she left last night for a trip and forgot to let us know she could not make today's appointment  Pt was reminded of our COVID 19 attendance policy that requires at least 24hr notice of cancellations, she stated understanding  Confirmed next scheduled appointment

## 2020-08-11 ENCOUNTER — OFFICE VISIT (OUTPATIENT)
Dept: PHYSICAL THERAPY | Facility: CLINIC | Age: 35
End: 2020-08-11
Payer: COMMERCIAL

## 2020-08-11 DIAGNOSIS — S93.402D SPRAIN OF LEFT ANKLE, UNSPECIFIED LIGAMENT, SUBSEQUENT ENCOUNTER: Primary | ICD-10-CM

## 2020-08-11 DIAGNOSIS — S93.402A SPRAIN OF LEFT ANKLE, UNSPECIFIED LIGAMENT, INITIAL ENCOUNTER: ICD-10-CM

## 2020-08-11 PROCEDURE — 97110 THERAPEUTIC EXERCISES: CPT | Performed by: PHYSICAL THERAPIST

## 2020-08-11 NOTE — PROGRESS NOTES
Daily Note     Today's date: 2020  Patient name: Gerilyn Hammans  : 1985  MRN: [id number]  Referring provider: Yao Morgan MD  Dx:   Encounter Diagnosis     ICD-10-CM    1  Sprain of left ankle, unspecified ligament, subsequent encounter  S93 402D    2  Sprain of left ankle, unspecified ligament, initial encounter  S93 402A                   Subjective: Pt reports she is doing better, and is starting to walk short distances w/o CAM boot, but still relies on it most of the day  Pain level has been 5/10 at worst, still increases w/ WB activity  She feels she has increased ease w/ HEP  Objective: See treatment diary below; advised pt to bring sneakers to PT to allow introduction to WB activities as tolerated  She is wearing       Assessment: Tolerated treatment well  Patient demonstrated fatigue post treatment and does c/o "pulling pain" w/ end range DF and PF w/ light resistance in area of tib posterior tendon behind medial maleolus  Edema is signficantly decreased from evaluation  Plan: Continue per plan of care  Progress treatment as tolerated  Add bike and light WB activity as vasquez  Precautions: Standard  CAM boot        Date            Visit # 1 2           Manuals                                       Neuro Re-Ed                                                                                                        Ther Ex  40'           Ankle AROM HEP 2x10 PD           Ankle circles HEP 2x10 cw/ccw           Gastroc str SOS HEP 20"x5           TB PF/DF  Red 2x10 ea           Towel crunch sit  20x           Sit heel slide for CC DF  5"x15           Sit HR  20x           jillian inv/ever w/ ball  5"x10 each           S/L Inv/ever  2x10 each                                     bike             HEP  10'           NMRed             Alt tap ups             Stand WB AROM PB/ML             Modalities             CP 10' 10'                        Access Code: [email or web address]   URL: [employer]  [email or web address]   Date: 08/11/2020   Prepared by:  Milton Joseph      Exercises  · Long Sitting Calf Stretch with Strap - 5 reps - 1 sets - 20 hold - 1x daily - 7x weekly  · Supine Ankle Circles - 10 reps - 2 sets - 1x daily - 7x weekly  · Sidelying Ankle Inversion with Ankle Weight - 10 reps - 2 sets - 1x daily - 7x weekly  · Sidelying Ankle Eversion Strengthening with Ankle Weight - 10 reps - 2 sets - 1x daily - 7x weekly  · Seated Toe Towel Scrunches - 10 reps - 2 sets - 2 hold - 1x daily - 7x weekly  · Seated Heel Slide - 10 reps - 2 sets - 5 hold - 1x daily - 7x weekly  · Seated Heel Raise - 10 reps - 2 sets - 1x daily - 7x weekly  · Isometric Ankle Inversion - 10 reps - 1 sets - 5 hold - 1x daily - 7x weekly  · Long Sitting Isometric Ankle Eversion in Plantar Flexion with Ball at Wall - 10 reps - 1 sets - 5 hold - 1x daily - 7x weekly

## 2020-08-13 ENCOUNTER — OFFICE VISIT (OUTPATIENT)
Dept: PHYSICAL THERAPY | Facility: CLINIC | Age: 35
End: 2020-08-13
Payer: COMMERCIAL

## 2020-08-13 DIAGNOSIS — S93.402D SPRAIN OF LEFT ANKLE, UNSPECIFIED LIGAMENT, SUBSEQUENT ENCOUNTER: Primary | ICD-10-CM

## 2020-08-13 DIAGNOSIS — S93.402A SPRAIN OF LEFT ANKLE, UNSPECIFIED LIGAMENT, INITIAL ENCOUNTER: ICD-10-CM

## 2020-08-13 PROCEDURE — 97110 THERAPEUTIC EXERCISES: CPT

## 2020-08-13 NOTE — PROGRESS NOTES
Daily Note     Today's date: 2020  Patient name: Marlis Nissen  : 1985  MRN: [id number]  Referring provider: Khloe Quinteros MD  Dx:   Encounter Diagnosis     ICD-10-CM    1  Sprain of left ankle, unspecified ligament, subsequent encounter  S93 402D    2  Sprain of left ankle, unspecified ligament, initial encounter  S93 402A                   Subjective: pt reports no pain on arrival, she reports wearing her sneaker a lot yesterday and her swelling has increased since her last visit and feels more stiff this morning  Presents to session in CAM boot      Objective: See treatment diary below      Assessment: Tolerated treatment well  Patient exhibited good technique with therapeutic exercises pt with increases in pain today with TB PD today more than last visit  Mild discomfort with the addition of the upright bike today  CP with elevation post session to help address the increase in swelling, pt educated to elevate at home while icing       Plan: Continue per plan of care  Precautions: Standard  CAM boot  Date      Visit # 1 2 3     Manuals                        Neuro Re-Ed                                                                Ther Ex  40' 40'     Ankle AROM HEP 2x10 PD 2x10 PD      Ankle circles HEP 2x10 cw/ccw 2x10 cw/ccw      Gastroc str SOS HEP 20"x5 20"x5      TB PF/DF  Red 2x10 ea Pain      Towel crunch sit  20x 2" 20x      Sit heel slide for CC DF  5"x15 5" x 15      Sit HR  20x 20x      jillian inv/ever w/ ball  5"x10 each 5"x15 each     S/L Inv/ever  2x10 each 2x10 each                     bike   L1 x 4'     HEP  10'      NMRed        Alt tap ups        Stand WB AROM PB/ML        Modalities        CP 10' 10' 6' elevated              Access Code: [email or web address]   URL: [employer]  com/   Date: 2020   Prepared by:  Arlis Pallas      Exercises  · Long Sitting Calf Stretch with Strap - 5 reps - 1 sets - 20 hold - 1x daily - 7x weekly  · Supine Ankle Circles - 10 reps - 2 sets - 1x daily - 7x weekly  · Sidelying Ankle Inversion with Ankle Weight - 10 reps - 2 sets - 1x daily - 7x weekly  · Sidelying Ankle Eversion Strengthening with Ankle Weight - 10 reps - 2 sets - 1x daily - 7x weekly  · Seated Toe Towel Scrunches - 10 reps - 2 sets - 2 hold - 1x daily - 7x weekly  · Seated Heel Slide - 10 reps - 2 sets - 5 hold - 1x daily - 7x weekly  · Seated Heel Raise - 10 reps - 2 sets - 1x daily - 7x weekly  · Isometric Ankle Inversion - 10 reps - 1 sets - 5 hold - 1x daily - 7x weekly  · Long Sitting Isometric Ankle Eversion in Plantar Flexion with Ball at [address] 10 reps - 1 sets - 5 hold - 1x daily - 7x weekly

## 2020-08-18 ENCOUNTER — OFFICE VISIT (OUTPATIENT)
Dept: PHYSICAL THERAPY | Facility: CLINIC | Age: 35
End: 2020-08-18
Payer: COMMERCIAL

## 2020-08-18 DIAGNOSIS — S93.402D SPRAIN OF LEFT ANKLE, UNSPECIFIED LIGAMENT, SUBSEQUENT ENCOUNTER: Primary | ICD-10-CM

## 2020-08-18 PROCEDURE — 97140 MANUAL THERAPY 1/> REGIONS: CPT | Performed by: PHYSICAL THERAPIST

## 2020-08-18 PROCEDURE — 97110 THERAPEUTIC EXERCISES: CPT | Performed by: PHYSICAL THERAPIST

## 2020-08-18 NOTE — PROGRESS NOTES
Daily Note     Today's date: 2020  Patient name: Chong Andrade  : 1985  MRN: [id number]  Referring provider: Benny Bingham MD  Dx:   Encounter Diagnosis     ICD-10-CM    1  Sprain of left ankle, unspecified ligament, subsequent encounter  D91 512J                   Subjective: Pt reports improving ROM, decreasing pain and variable edema  She walks barefoot most of the day if she's indoors, but will wear the CAM boot if she's outside a lot on uneven terrain  Objective: See treatment diary below      Assessment: Tolerated treatment well  Patient demonstrated fatigue post treatment and was able to advance to some weight bearing activity  Tib posterior is painful w/ more than light resistance to PF  Edema is at distal insertion of tib post       Plan: Progress treatment as tolerated  Precautions: Standard  CAM boot  Date 20    Visit # 1 2 3 4    Manuals    10'    ktape L Tib posterior     Full length I strip applied w/ instr to remove in 2-3 days                    Ther Ex  40' 40' 25'    Ankle AROM HEP 2x10 PD 2x10 PD      Ankle circles HEP 2x10 cw/ccw 2x10 cw/ccw  1# 20x cw/ccw    Gastroc str SOS HEP 20"x5 20"x5  20"x5    TB PF/DF  Red 2x10 ea Pain      Towel crunch sit  20x 2" 20x  2"x30 stand    Sit heel slide for CC DF  5"x15 5" x 15      Sit HR  20x 20x      jillian inv/ever w/ ball  5"x10 each 5"x15 each     S/L Inv/ever  2x10 each 2x10 each 1# 2x10    Prone PF knee flexed    1# 2x10    Stand gastroc stretch    20"x5 L    bike   L1 x 4' L2x5'    HEP  10'      NMRed    10'    Alt 2 cone taps     1x15 R/L    Stand WB AROM PB/ML    30x    WB bal AP    1'    Modalities        CP 10' 10' 6' elevated  5'             Access Code: [email or web address]   URL: [employer]  com/   Date: 2020   Prepared by:  Dannie Arteaga      Exercises  · Long Sitting Calf Stretch with Strap - 5 reps - 1 sets - 20 hold - 1x daily - 7x weekly  · Supine Ankle Circles - 10 reps - 2 sets - 1x daily - 7x weekly  · Sidelying Ankle Inversion with Ankle Weight - 10 reps - 2 sets - 1x daily - 7x weekly  · Sidelying Ankle Eversion Strengthening with Ankle Weight - 10 reps - 2 sets - 1x daily - 7x weekly  · Seated Toe Towel Scrunches - 10 reps - 2 sets - 2 hold - 1x daily - 7x weekly  · Seated Heel Slide - 10 reps - 2 sets - 5 hold - 1x daily - 7x weekly  · Seated Heel Raise - 10 reps - 2 sets - 1x daily - 7x weekly  · Isometric Ankle Inversion - 10 reps - 1 sets - 5 hold - 1x daily - 7x weekly  · Long Sitting Isometric Ankle Eversion in Plantar Flexion with Ball at [address] 10 reps - 1 sets - 5 hold - 1x daily - 7x weekly

## 2020-08-20 ENCOUNTER — OFFICE VISIT (OUTPATIENT)
Dept: PHYSICAL THERAPY | Facility: CLINIC | Age: 35
End: 2020-08-20
Payer: COMMERCIAL

## 2020-08-20 DIAGNOSIS — S93.402D SPRAIN OF LEFT ANKLE, UNSPECIFIED LIGAMENT, SUBSEQUENT ENCOUNTER: Primary | ICD-10-CM

## 2020-08-20 DIAGNOSIS — S93.402A SPRAIN OF LEFT ANKLE, UNSPECIFIED LIGAMENT, INITIAL ENCOUNTER: ICD-10-CM

## 2020-08-20 PROCEDURE — 97110 THERAPEUTIC EXERCISES: CPT

## 2020-08-20 NOTE — PROGRESS NOTES
Daily Note     Today's date: 2020  Patient name: Josselin Carrasco  : 1985  MRN: [id number]  Referring provider: Ana Maria Salamanca MD  Dx:   Encounter Diagnosis     ICD-10-CM    1  Sprain of left ankle, unspecified ligament, subsequent encounter  S93 402D    2  Sprain of left ankle, unspecified ligament, initial encounter  S93 402A                   Subjective: pt reports she threw out her back yesterday and that's why she couldn't make it early this morning, has been sitting most of the day so her ankle is not feeling too bad, reports some soreness in her ankle after last visit she thinks she over did it, could not even get her boot on due to the increase in pain  Objective: See treatment diary below      Assessment: Tolerated treatment well  Patient exhibited good technique with therapeutic exercises Exercise modifications today due to increased ankle soreness after last visit  Held standing exercises today due to back pain  Cont with increases in pain with AP AROM     Plan: Continue per plan of care  Precautions: Standard  CAM boot        Date 20 FOTO   Visit # 1 2 3 4 5    Manuals    10'    ktape L Tib posterior     Full length I strip applied w/ instr to remove in 2-3 days Applied                    Ther Ex  40' 40' 25' 35'   Ankle AROM HEP 2x10 PD 2x10 PD   2x10    Ankle circles HEP 2x10 cw/ccw 2x10 cw/ccw  1# 20x cw/ccw 1# 20x cw/ccw    Gastroc str SOS HEP 20"x5 20"x5  20"x5 20"x5    TB PF/DF  Red 2x10 ea Pain      Towel crunch sit  20x 2" 20x  2"x30 stand Seated 2" 30x    Sit heel slide for CC DF  5"x15 5" x 15   5" 2x10    Sit HR  20x 20x   2" 20x    jillian inv/ever w/ ball  5"x10 each 5"x15 each  5" 2x10 each    S/L Inv/ever  2x10 each 2x10 each 1# 2x10    Prone PF knee flexed    1# 2x10    Stand gastroc stretch    20"x5 L    bike   L1 x 4' L2x5' L1x5'   HEP  10'      NMRed    10' 5'   Alt 2 cone taps     1x15 R/L    Stand WB AROM PB/ML    30x Seated [place] board 20xeach    WB bal AP    1'    Modalities        CP 10' 10' 6' elevated  5'  5'           Access Code: [email or web address]   URL: [employer]  com/   Date: 08/11/2020   Prepared by:  Carolyn Gabriel      Exercises  · Long Sitting Calf Stretch with Strap - 5 reps - 1 sets - 20 hold - 1x daily - 7x weekly  · Supine Ankle Circles - 10 reps - 2 sets - 1x daily - 7x weekly  · Sidelying Ankle Inversion with Ankle Weight - 10 reps - 2 sets - 1x daily - 7x weekly  · Sidelying Ankle Eversion Strengthening with Ankle Weight - 10 reps - 2 sets - 1x daily - 7x weekly  · Seated Toe Towel Scrunches - 10 reps - 2 sets - 2 hold - 1x daily - 7x weekly  · Seated Heel Slide - 10 reps - 2 sets - 5 hold - 1x daily - 7x weekly  · Seated Heel Raise - 10 reps - 2 sets - 1x daily - 7x weekly  · Isometric Ankle Inversion - 10 reps - 1 sets - 5 hold - 1x daily - 7x weekly  · Long Sitting Isometric Ankle Eversion in Plantar Flexion with Ball at Wall - 10 reps - 1 sets - 5 hold - 1x daily - 7x weekly

## 2020-08-25 ENCOUNTER — TELEPHONE (OUTPATIENT)
Dept: PHYSICAL THERAPY | Facility: CLINIC | Age: 35
End: 2020-08-25

## 2020-08-25 NOTE — TELEPHONE ENCOUNTER
Called pt in regards to her [address] today, pt did not answer and voice mailbox was full and unable to leave a message, will call again later to try and confirm next visit

## 2020-08-27 ENCOUNTER — OFFICE VISIT (OUTPATIENT)
Dept: PHYSICAL THERAPY | Facility: CLINIC | Age: 35
End: 2020-08-27
Payer: COMMERCIAL

## 2020-08-27 DIAGNOSIS — S93.402D SPRAIN OF LEFT ANKLE, UNSPECIFIED LIGAMENT, SUBSEQUENT ENCOUNTER: Primary | ICD-10-CM

## 2020-08-27 DIAGNOSIS — S93.402A SPRAIN OF LEFT ANKLE, UNSPECIFIED LIGAMENT, INITIAL ENCOUNTER: ICD-10-CM

## 2020-08-27 PROCEDURE — 97112 NEUROMUSCULAR REEDUCATION: CPT

## 2020-08-27 PROCEDURE — 97110 THERAPEUTIC EXERCISES: CPT

## 2020-08-27 NOTE — PROGRESS NOTES
Daily Note     Today's date: 2020  Patient name: Raphael Vitale  : 1985  MRN: [id number]  Referring provider: Melissa Hernandez MD  Dx:   Encounter Diagnosis     ICD-10-CM    1  Sprain of left ankle, unspecified ligament, subsequent encounter  S93 402D    2  Sprain of left ankle, unspecified ligament, initial encounter  S93 402A                   Subjective: pt reports 2/10 on arrival today, she is usually more painful in the PM after being on her feet for prolonged time  Reports her pain has been better, cont to have increases in swelling in medial ankle       Objective: See treatment diary below      Assessment: Tolerated treatment fair  Patient exhibited good technique with therapeutic exercises exercise progression today due to pt reports of her pain being better, some increases in pain with increased standing WB activities  Increases in pain with use of [employer] board  Pt inquired about a brace due to her ankle feeling unsteady when walking on uneven ground frequently around her home, modified taping technique and pt felt like she had more stability, will report back next visit  Plan: Continue per plan of care  Precautions: Standard  CAM boot        Date 20       Visit # 6       Manuals        ktape L Tib posterior  & ATFL Applied                        Ther Ex 30'       Ankle AROM 2x10        Ankle circles 1# 20x cw/ccw       Gastroc str SOS 20"x5       TB PF/DF        Towel crunch sit Seated 2" 30x         Sit heel slide for CC DF On step 5" 2x10       Sit HR Stand 1x10         jillian inv/ever w/ ball          S/L Inv/ever 1# 2x10 EV pain with Inv       Prone PF knee flexed 1# 2x10        Stand gastroc stretch 20"x5 L        bike L2x5'       HEP        NMRed 10'       Alt 2 cone taps  2x10 R/L        Stand WB AROM PB/ML stand [place] board 10xeach  Seated [id number]       WB bal AP 1'       Modalities        CP 5'               Access Code: [email or web address]   URL: [employer]  com/   Date: 08/11/2020   Prepared by:  Jennifer Sherwood      Exercises  · Long Sitting Calf Stretch with Strap - 5 reps - 1 sets - 20 hold - 1x daily - 7x weekly  · Supine Ankle Circles - 10 reps - 2 sets - 1x daily - 7x weekly  · Sidelying Ankle Inversion with Ankle Weight - 10 reps - 2 sets - 1x daily - 7x weekly  · Sidelying Ankle Eversion Strengthening with Ankle Weight - 10 reps - 2 sets - 1x daily - 7x weekly  · Seated Toe Towel Scrunches - 10 reps - 2 sets - 2 hold - 1x daily - 7x weekly  · Seated Heel Slide - 10 reps - 2 sets - 5 hold - 1x daily - 7x weekly  · Seated Heel Raise - 10 reps - 2 sets - 1x daily - 7x weekly  · Isometric Ankle Inversion - 10 reps - 1 sets - 5 hold - 1x daily - 7x weekly  · Long Sitting Isometric Ankle Eversion in Plantar Flexion with Ball at Wall - 10 reps - 1 sets - 5 hold - 1x daily - 7x weekly

## 2020-09-01 ENCOUNTER — OFFICE VISIT (OUTPATIENT)
Dept: PHYSICAL THERAPY | Facility: CLINIC | Age: 35
End: 2020-09-01
Payer: COMMERCIAL

## 2020-09-01 DIAGNOSIS — S93.402D SPRAIN OF LEFT ANKLE, UNSPECIFIED LIGAMENT, SUBSEQUENT ENCOUNTER: Primary | ICD-10-CM

## 2020-09-01 PROCEDURE — 97110 THERAPEUTIC EXERCISES: CPT | Performed by: PHYSICAL THERAPIST

## 2020-09-01 PROCEDURE — 97112 NEUROMUSCULAR REEDUCATION: CPT | Performed by: PHYSICAL THERAPIST

## 2020-09-01 NOTE — PROGRESS NOTES
Daily Note     Today's date: 2020  Patient name: Yoko Anaya  : 1985  MRN: [id number]  Referring provider: Damon Garcia MD  Dx:   Encounter Diagnosis     ICD-10-CM    1  Sprain of left ankle, unspecified ligament, subsequent encounter  V17 606D                   Subjective: Pt reports she's had almost no pain the past few days, but still has some swelling  She feels the ktape as applied last visit was very helpful  Objective: See treatment diary below; progressed program      Assessment: Tolerated treatment well  Patient demonstrated fatigue post treatment and was able to progress to Weight bearing NMRed and to strenghtening activities w/o c/o pain  Plan: Progress treatment as tolerated  Precautions: Standard  CAM boot  SOC: 2020  POC expiration:  FOTO: 2020  RE:   Daily Treament Log:  Date 20      Visit # 6 7      Manuals  5'      ktape L Tib posterior  & ATFL Applied  & ATFL applied                      Ther Ex 30' 15'      Ankle AROM 2x10        Ankle circles 1# 20x cw/ccw 2# 20x cw/ccw      Gastroc str SOS 20"x5       TB B/L Ever  W/ ball red 2x10      TB L inver  grn 2x10      Towel crunch sit Seated 2" 30x         Sit heel slide for CC DF On step 5" 2x10       Sit HR Stand 1x10   Stand 2x105'      S/L Inv/ever 1# 2x10 EV pain with Inv       Prone PF knee flexed 1# 2x10        Stand gastroc stretch 20"x5 L        bike L2x5' L2x6'      HEP        NMRed 10' 25'      Alt 2 cone taps  2x10 R/L  3 cones 2x10      Stand WB AROM PB/ML stand [place] board 10xeach  Seated [id number] L2 BAPS split stance 2x10 cw/ccw      WB bal AP 1' 1' AP&ML      High march ladder  2 laps      Tandem walk  20'x2      FSU w/ hi knee  8" 2x10 R/L      Modalities        CP 5' 5'              Access Code: [email or web address]   URL: [email or web address]   Date: 2020   Prepared by:  Ronny Enter      Exercises  · Long Sitting Calf Stretch with Strap - 5 reps - 1 sets - 20 hold - 1x daily - 7x weekly  · Supine Ankle Circles - 10 reps - 2 sets - 1x daily - 7x weekly  · Sidelying Ankle Inversion with Ankle Weight - 10 reps - 2 sets - 1x daily - 7x weekly  · Sidelying Ankle Eversion Strengthening with Ankle Weight - 10 reps - 2 sets - 1x daily - 7x weekly  · Seated Toe Towel Scrunches - 10 reps - 2 sets - 2 hold - 1x daily - 7x weekly  · Seated Heel Slide - 10 reps - 2 sets - 5 hold - 1x daily - 7x weekly  · Seated Heel Raise - 10 reps - 2 sets - 1x daily - 7x weekly  · Isometric Ankle Inversion - 10 reps - 1 sets - 5 hold - 1x daily - 7x weekly  · Long Sitting Isometric Ankle Eversion in Plantar Flexion with Ball at Wall - 10 reps - 1 sets - 5 hold - 1x daily - 7x weekly

## 2020-09-03 ENCOUNTER — OFFICE VISIT (OUTPATIENT)
Dept: PHYSICAL THERAPY | Facility: CLINIC | Age: 35
End: 2020-09-03
Payer: COMMERCIAL

## 2020-09-03 DIAGNOSIS — S93.402D SPRAIN OF LEFT ANKLE, UNSPECIFIED LIGAMENT, SUBSEQUENT ENCOUNTER: Primary | ICD-10-CM

## 2020-09-03 DIAGNOSIS — S93.402A SPRAIN OF LEFT ANKLE, UNSPECIFIED LIGAMENT, INITIAL ENCOUNTER: ICD-10-CM

## 2020-09-03 PROCEDURE — 97110 THERAPEUTIC EXERCISES: CPT

## 2020-09-03 PROCEDURE — 97112 NEUROMUSCULAR REEDUCATION: CPT

## 2020-09-03 NOTE — PROGRESS NOTES
Daily Note     Today's date: 9/3/2020  Patient name: Sultana Wang  : 1985  MRN: [id number]  Referring provider: Shelia Laguerre MD  Dx:   Encounter Diagnosis     ICD-10-CM    1  Sprain of left ankle, unspecified ligament, subsequent encounter  S93 402D    2  Sprain of left ankle, unspecified ligament, initial encounter  S93 402A                   Subjective: pt reports her ankle is feeling good today, no pain on arrival    Pt arrived 10 min late       Objective: See treatment diary below      Assessment: Tolerated treatment well  Patient exhibited good technique with therapeutic exercises some soreness post session, spent most of session doing WB activities, tolerated better than previously  Plan: Continue per plan of care  Precautions: Standard  CAM boot    SOC: 2020  POC expiration:  FOTO: 2020  RE:   Daily Treament Log:  Date 8/27/20 2020 9/3/2020     Visit # 6 7 8     Manuals  5'      ktape L Tib posterior  & ATFL Applied  & ATFL applied & ATFL applied                      Ther Ex 30' 15' 10'     Ankle AROM 2x10        Ankle circles 1# 20x cw/ccw 2# 20x cw/ccw 2# 20x cw/ccw      Gastroc str SOS 20"x5       TB B/L Ever  W/ ball red 2x10 W/ ball red 2x10     TB L inver  grn 2x10 grn 2x10      Towel crunch sit Seated 2" 30x         Sit heel slide for CC DF On step 5" 2x10       Sit HR Stand 1x10   Stand 2x10 Stand 2x10      S/L Inv/ever 1# 2x10 EV pain with Inv       Prone PF knee flexed 1# 2x10        Stand gastroc stretch 20"x5 L   20"x5 L      bike L2x5' L2x6' L2x 5'      HEP        NMRed 10' 25' 25'     Alt 2 cone taps  2x10 R/L  3 cones 2x10 3 cones 2x10      Stand WB AROM PB/ML stand [employer] board 10xeach  Seated [id number] L2 BAPS split stance 2x10 cw/ccw L2 BAPS split stance 2x10 cw/ccw      WB bal AP 1' 1' AP&ML 1' AP&ML      High march ladder  2 laps      Tandem walk  20'x2 20'x2      FSU w/ hi knee  8" 2x10 R/L 8" 2x10 R/L     Modalities        CP 5' 5' 5' Access Code: [email or web address]   URL: [email or web address]   Date: 08/11/2020   Prepared by:  Alexei Burn      Exercises  · Long Sitting Calf Stretch with Strap - 5 reps - 1 sets - 20 hold - 1x daily - 7x weekly  · Supine Ankle Circles - 10 reps - 2 sets - 1x daily - 7x weekly  · Sidelying Ankle Inversion with Ankle Weight - 10 reps - 2 sets - 1x daily - 7x weekly  · Sidelying Ankle Eversion Strengthening with Ankle Weight - 10 reps - 2 sets - 1x daily - 7x weekly  · Seated Toe Towel Scrunches - 10 reps - 2 sets - 2 hold - 1x daily - 7x weekly  · Seated Heel Slide - 10 reps - 2 sets - 5 hold - 1x daily - 7x weekly  · Seated Heel Raise - 10 reps - 2 sets - 1x daily - 7x weekly  · Isometric Ankle Inversion - 10 reps - 1 sets - 5 hold - 1x daily - 7x weekly  · Long Sitting Isometric Ankle Eversion in Plantar Flexion with Ball at Wall - 10 reps - 1 sets - 5 hold - 1x daily - 7x weekly

## 2020-09-08 ENCOUNTER — OFFICE VISIT (OUTPATIENT)
Dept: PHYSICAL THERAPY | Facility: CLINIC | Age: 35
End: 2020-09-08
Payer: COMMERCIAL

## 2020-09-08 DIAGNOSIS — S93.402A SPRAIN OF LEFT ANKLE, UNSPECIFIED LIGAMENT, INITIAL ENCOUNTER: ICD-10-CM

## 2020-09-08 DIAGNOSIS — S93.402D SPRAIN OF LEFT ANKLE, UNSPECIFIED LIGAMENT, SUBSEQUENT ENCOUNTER: Primary | ICD-10-CM

## 2020-09-08 PROCEDURE — 97110 THERAPEUTIC EXERCISES: CPT | Performed by: PHYSICAL THERAPIST

## 2020-09-08 NOTE — PROGRESS NOTES
Daily Note     Today's date: 2020  Patient name: Jia Don  : 1985  MRN: [id number]  Referring provider: Alysa García MD  Dx:   Encounter Diagnosis     ICD-10-CM    1  Sprain of left ankle, unspecified ligament, subsequent encounter  S93 402D    2  Sprain of left ankle, unspecified ligament, initial encounter  S93 402A                   Subjective: pt reports little remaining discomfort but acknowledges her ankle becomes weak and feels like it wants to give out on her when it's tired  Objective: See treatment diary below; mild skin abrasion at heel, so changed taping technique      Assessment: Tolerated treatment well  Patient demonstrated fatigue post treatment and was able to progress balance activities  Mild edema remains      Plan: Progress note during next visit  Precautions: Standard  CAM boot    SOC: 2020  POC expiration:  FOTO: 2020  RE:   Daily Treament Log:  Date 8/27/20 2020 9/3/2020 2020    Visit # 6 7 8 9    Manuals  5'  5'    ktape L Tib posterior  & ATFL Applied  & ATFL applied & ATFL applied  Achilles w/ "V" inferior end                    Ther Ex 30' 15' 10'     Ankle AROM 2x10        Ankle circles 1# 20x cw/ccw 2# 20x cw/ccw 2# 20x cw/ccw      Gastroc str SOS 20"x5       TB B/L Ever  W/ ball red 2x10 W/ ball red 2x10     TB L inver  grn 2x10 grn 2x10      Towel crunch sit Seated 2" 30x         Sit heel slide for CC DF On step 5" 2x10       Sit HR Stand 1x10   Stand 2x10 Stand 2x10  Off step 2x10    S/L Inv/ever 1# 2x10 EV pain with Inv   1# 2x10 ea    Prone PF knee flexed 1# 2x10    1# 30x    Stand gastroc stretch 20"x5 L   20"x5 L      bike L2x5' L2x6' L2x 5'  L2x5'    HEP        NMRed 10' 25' 25' 15'    Alt 2 cone taps  2x10 R/L  3 cones 2x10 3 cones 2x10      Stand WB AROM PB/ML stand [employer] board 10xeach  Seated [id number] L2 BAPS split stance 2x10 cw/ccw L2 BAPS split stance 2x10 cw/ccw  SLS BAPS L2 w/ UE support 2x10 cw/ccw    WB bal AP 1' 1' AP&ML 1' AP&ML      High march ladder  2 laps      Tandem walk  20'x2 20'x2  W/ alt cone taps 16 cones x2    FSU w/ hi knee  8" 2x10 R/L 8" 2x10 R/L 8" 2x10 R/L    Modalities        CP 5' 5' 5'  5' to end            Access Code: [email or web address]: [email or web address]   Date: 08/11/2020   Prepared by:  Dannie Arteaga      Exercises  · Long Sitting Calf Stretch with Strap - 5 reps - 1 sets - 20 hold - 1x daily - 7x weekly  · Supine Ankle Circles - 10 reps - 2 sets - 1x daily - 7x weekly  · Sidelying Ankle Inversion with Ankle Weight - 10 reps - 2 sets - 1x daily - 7x weekly  · Sidelying Ankle Eversion Strengthening with Ankle Weight - 10 reps - 2 sets - 1x daily - 7x weekly  · Seated Toe Towel Scrunches - 10 reps - 2 sets - 2 hold - 1x daily - 7x weekly  · Seated Heel Slide - 10 reps - 2 sets - 5 hold - 1x daily - 7x weekly  · Seated Heel Raise - 10 reps - 2 sets - 1x daily - 7x weekly  · Isometric Ankle Inversion - 10 reps - 1 sets - 5 hold - 1x daily - 7x weekly  · Long Sitting Isometric Ankle Eversion in Plantar Flexion with Ball at Wall - 10 reps - 1 sets - 5 hold - 1x daily - 7x weekly

## 2020-09-10 ENCOUNTER — EVALUATION (OUTPATIENT)
Dept: PHYSICAL THERAPY | Facility: CLINIC | Age: 35
End: 2020-09-10
Payer: COMMERCIAL

## 2020-09-10 DIAGNOSIS — S93.402D SPRAIN OF LEFT ANKLE, UNSPECIFIED LIGAMENT, SUBSEQUENT ENCOUNTER: Primary | ICD-10-CM

## 2020-09-10 DIAGNOSIS — S93.402A SPRAIN OF LEFT ANKLE, UNSPECIFIED LIGAMENT, INITIAL ENCOUNTER: ICD-10-CM

## 2020-09-10 PROCEDURE — 97112 NEUROMUSCULAR REEDUCATION: CPT | Performed by: PHYSICAL THERAPIST

## 2020-09-10 PROCEDURE — 97140 MANUAL THERAPY 1/> REGIONS: CPT | Performed by: PHYSICAL THERAPIST

## 2020-09-10 PROCEDURE — 97110 THERAPEUTIC EXERCISES: CPT | Performed by: PHYSICAL THERAPIST

## 2020-09-10 NOTE — PROGRESS NOTES
PT Re-Evaluation     Today's date: 9/10/2020  Patient name: Jessica Marquez  : 1985  MRN: [id number]  Referring provider: Wilmer Wang MD  Dx:   Encounter Diagnosis     ICD-10-CM    1  Sprain of left ankle, unspecified ligament, subsequent encounter  S93 402D    2  Sprain of left ankle, unspecified ligament, initial encounter  S93 402A                   Assessment  Assessment details: 2020- Jessica Marquez is a 28 y o  female who presents with pain, decreased strength, decreased ROM, decreased joint mobility, joint effusion and ambulatory dysfunction  Due to these impairments, patient has difficulty performing ADL's, recreational activities, engaging in social activities, ambulation, stair negotiation  Patient's clinical presentation is consistent with their referring diagnosis of Sprain of left ankle, unspecified ligament, subsequent encounter  (primary encounter diagnosis)  Patient has been educated in home exercise program and plan of care  Patient would benefit from skilled physical therapy services to address their aforementioned functional limitations and progress towards prior level of function and independence with home exercise program      9/10/2020 - pt is improving towards goals but retains some pain, primarily at the end of the day and/or after prolonged WB activity, mid/mod edema to Lat malleolus remains  Pt has D/E'd CAM boot and is working towards improving WB tolerance and strength  She would benefit from continued PT to work towards the remaining of her goals  She remains weak w/ inversion>eversion and w/ functional plantar flexion, but does demonstrate progress       Impairments: abnormal gait, abnormal or restricted ROM, activity intolerance, impaired physical strength, pain with function, weight-bearing intolerance and poor body mechanics    Goals  Short Term Goals to be accomplished in 4 weeks:  met  STG1: Pt will be I with HEP  STG2: Pt will demo 50% inc in ankle AROM  STG3: Pt will demo 1/2 MMT strength in ankle   STG4: Pt will amb community distance without gait deviates due to pain     Long Term Goals to be accomplished in 12 weeks: ongoing/progressing toward  LTG1: Pt will demo ankle strength WNL to return to PLOF  LTG2: Pt will demo ankle AROM WNL to minimize gait deviations  LTG3: Pt will return to household ADLs and work duties pain free as per [employer]      Plan  Plan details: HEP development, stretching, strengthening, A/AA/PROM, joint mobilizations, posture education, STM/MI as needed to reduce muscle tension, muscle reeducation, PLOC discussed and agreed upon with patient  Patient would benefit from: PT eval and skilled physical therapy  Planned modality interventions: cryotherapy and thermotherapy: hydrocollator packs  Planned therapy interventions: manual therapy, neuromuscular re-education, self care, therapeutic activities, therapeutic exercise and home exercise program  Frequency: 2x week  Plan of Care beginning date: 2020  Plan of Care expiration date: 10/28/2020  Treatment plan discussed with: patient        Subjective Evaluation    History of Present Illness  Mechanism of injury: 2020: Pt presents w/ c/c of L ankle pain secondary to jumping off a golf cart and twisted her ankle  Pt initially felt severe pain and swelling  She went to the hospital the day of injury and was placed in a type of cast  Pt then saw Dr Shane Mustafa one week later and was placed in CAM boot  Xray was negative for fracture  She feels she was improving until Friday when she was walking down the stairs and stepped off wrong this past week which re-irritated her ankle  9/10/2020: Pt reports pain is intermittent, usually only at the end of the day  Typically worse if she did a lot of WB activity on uneven terrain  She feels her ankle gets weak w/ a lot of WB activity, but is progressing well       Quality of life: excellent    Pain  Current pain ratin  At best pain rating: 0  At worst pain ratin  Location: general ankle pain   Quality: tight, pulling, dull ache and discomfort  Relieving factors: ice and rest  Aggravating factors: walking, standing and stair climbing  Progression: improved    Social Support  Stairs in house: yes   12  Lives in: multiple-level home    Employment status: not working  Hand dominance: right      Diagnostic Tests  X-ray: normal  Treatments  Previous treatment: immobilization  Patient Goals  Patient goals for therapy: decreased pain, increased motion, return to sport/leisure activities, independence with ADLs/IADLs and increased strength          Objective    Flowsheet Rows      Most Recent Value   PT/OT G-Codes   Current Score  63   Projected Score  70      AROM:    L  R  L      9/10/2020 2020 2020  Ankle DF calc/5thray  10/5  10 calc  5*  Ankle PF calc/5thray    35 5th ray 10*  Ankle inversion  25*  30  15*  Ankle eversion   17*  25  15*    STRENGTH:    L  R  L      2020    Ankle DF   5/5  5/5  3/5*  Ankle PF   4+/5  5/5  3/5*  Ankle inversion  4/5  5/5  3/5*  Ankle eversoin   4+/5  5/5  3/5*    Knee ext   5/5  5/5  4+/5  Knee flex   5/5  5/5  4/5  Hip abduction   5/5  5/5  4/5  Hip extension   5/5  5/5  4/5      Flexibility:   (+)Tightness gastroc/soleus, hamstring    Joint Mobility:   Hypomobility noted talocrural jt 9/10/2020 & 2020  Hypomobility noted subtalar jt 9/10/2020 & 2020    Tenderness/Palpation:  (+) TTP deltoid ligament, tib posterior and peroneus tendon    Observation: 2020 - ecchymosis resolved, mild/mod local edema remains L lateral maleolus  2020 - Moderate edema noted t/o med/lat ankle jt as well as t/o foot    Ecchymosis and bruising noted t/o med/lat ankle and foot     Balance: 2020   Tandem   30 sec (+) each way  R SLS EO 30 sec (+)  L SLS EO 20 sec    Function:  Gait: Antalgic: 9/10/2020 - WNL level surfaces, becomes antalgic after prolonged balance activities/uneven terrain (has D/C CAM boot)    8/5/2002 -, decreased stance time on LLE  Step up/down: 9/10/2020 - no difficulty w/ recip stairs, has D/C'd CAM boot    8/5/2020 - Pain, use of HR, reciprocal with CAM boot, unable without boot            Precautions: Standard  SOC: 8/5/2020  POC expiration: 10/28/2020  FOTO: 9/10/2020  RE:   Daily Treament Log:  Date 8/27/20 9/1/2020 9/3/2020 9/8/2020 9/10/2020  FOTO/RE   Visit # 6 7 8 9 10   Manuals  5'  5' 10'   ktape L Tib posterior  & ATFL Applied  & ATFL applied & ATFL applied  Achilles w/ "V" inferior end tib post and ATFL applied   MMT/palp/ROM     5'           Ther Ex 30' 15' 10'  10   Ankle AROM 2x10        Ankle circles 1# 20x cw/ccw 2# 20x cw/ccw 2# 20x cw/ccw      Gastroc str SOS 20"x5       TB B/L Ever  W/ ball red 2x10 W/ ball red 2x10     TB L inver  grn 2x10 grn 2x10      Towel crunch sit Seated 2" 30x         Sit heel slide for CC DF On step 5" 2x10       Sit HR Stand 1x10   Stand 2x10 Stand 2x10  Off step 2x10    S/L Inv/ever 1# 2x10 EV pain with Inv   1# 2x10 ea 1# 20x each   Prone PF knee flexed 1# 2x10    1# 30x    Stand gastroc stretch 20"x5 L   20"x5 L      Straddle step ups     8" step 2x10 each way   bike L2x5' L2x6' L2x 5'  L2x5' L3x5'   HEP        NMRed 10' 25' 25' 15' 20'   Alt 2 cone taps  2x10 R/L  3 cones 2x10 3 cones 2x10      Stand WB AROM PB/ML stand [place] board 10xeach  Seated [id number] L2 BAPS split stance 2x10 cw/ccw L2 BAPS split stance 2x10 cw/ccw  SLS BAPS L2 w/ UE support 2x10 cw/ccw SLS BAPS L2 w UE support 2x10 cw/ccw   WB bal AP 1' 1' AP&ML 1' AP&ML   1' AP & ML   [employer] ladder  2 laps      Tandem walk  20'x2 20'x2  W/ alt cone taps 16 cones x2 W/ alt cone taps 16 cones x2   FSU w/ hi knee  8" 2x10 R/L 8" 2x10 R/L 8" 2x10 R/L 6"+2" foam 2x10   SLS L      20"x2   Modalities        CP 5' 5' 5'  5' to end 5' to end           Access Code: [email or web address]   URL: [email or web address]   Date: 08/11/2020   Prepared by:  Rima Mathew Exercises  · Long Sitting Calf Stretch with Strap - 5 reps - 1 sets - 20 hold - 1x daily - 7x weekly  · Supine Ankle Circles - 10 reps - 2 sets - 1x daily - 7x weekly  · Sidelying Ankle Inversion with Ankle Weight - 10 reps - 2 sets - 1x daily - 7x weekly  · Sidelying Ankle Eversion Strengthening with Ankle Weight - 10 reps - 2 sets - 1x daily - 7x weekly  · Seated Toe Towel Scrunches - 10 reps - 2 sets - 2 hold - 1x daily - 7x weekly  · Seated Heel Slide - 10 reps - 2 sets - 5 hold - 1x daily - 7x weekly  · Seated Heel Raise - 10 reps - 2 sets - 1x daily - 7x weekly  · Isometric Ankle Inversion - 10 reps - 1 sets - 5 hold - 1x daily - 7x weekly  · Long Sitting Isometric Ankle Eversion in Plantar Flexion with Ball at Wall - 10 reps - 1 sets - 5 hold - 1x daily - 7x weekly

## 2020-09-15 ENCOUNTER — OFFICE VISIT (OUTPATIENT)
Dept: PHYSICAL THERAPY | Facility: CLINIC | Age: 35
End: 2020-09-15
Payer: COMMERCIAL

## 2020-09-15 DIAGNOSIS — S93.402A SPRAIN OF LEFT ANKLE, UNSPECIFIED LIGAMENT, INITIAL ENCOUNTER: ICD-10-CM

## 2020-09-15 DIAGNOSIS — S93.402D SPRAIN OF LEFT ANKLE, UNSPECIFIED LIGAMENT, SUBSEQUENT ENCOUNTER: Primary | ICD-10-CM

## 2020-09-15 PROCEDURE — 97110 THERAPEUTIC EXERCISES: CPT

## 2020-09-15 PROCEDURE — 97112 NEUROMUSCULAR REEDUCATION: CPT

## 2020-09-15 NOTE — PROGRESS NOTES
Daily Note     Today's date: 9/15/2020  Patient name: Medina Sweeney  : 1985  MRN: [id number]  Referring provider: Rafael Barrera MD  Dx:   Encounter Diagnosis     ICD-10-CM    1  Sprain of left ankle, unspecified ligament, subsequent encounter  S93 402D    2  Sprain of left ankle, unspecified ligament, initial encounter  S93 402A                   Subjective: pt reports her ankle is feeling pretty good, just some soreness/tenderness but not pain  Objective: See treatment diary below      Assessment: Tolerated treatment well  Patient exhibited good technique with therapeutic exercises Pt with L ankle fatigue/instability post SLS exercises  Verbal cueing with B/L Ev to avoid hip rotation compensation and isolate ankle motions  Pt with increased WB activity tolerance today with minimal increases in soreness  Plan: Continue per plan of care  Precautions: Standard  SOC: 2020  POC expiration: 10/28/2020  FOTO: 9/10/2020  RE:   Daily Treament Log:  Date 9/15/20       Visit # 11       Manuals        ktape L Tib posterior  tib post and ATFL applied        MMT/palp/ROM                Ther Ex 30'       Ankle AROM        Ankle circles 2# 20x cw/ccw                 TB B/L Ever W/ ball red 2x10        TB L inver grn 2x10        Towel crunch sit        Sit heel slide for CC DF        Stand HR 20x         S/L Inv/ever 1# 20x each        Prone PF knee flexed        Stand gastroc stretch 15"x5 L Slant board         Straddle step ups 8" step 2x10 R/L       bike L2x5'       HEP        NMRed 25'       Alt 3 cone taps  1x10 R/L  From foam       Stand WB AROM PB/ML L2 BAPS split stance 2x10 cw/ccw        WB bal AP 1' AP/ML        High march ladder         Tandem walk W/ alt cone taps 10 cones x2        FSU w/ hi knee 6"+2" foam 2x10 R/L       SLS L  15" x 4 L       Modalities        CP 6'               Access Code: [email or web address]   URL: [email or web address]   Date: 2020   Prepared by:  Elias Pollock Elena      Exercises  · Long Sitting Calf Stretch with Strap - 5 reps - 1 sets - 20 hold - 1x daily - 7x weekly  · Supine Ankle Circles - 10 reps - 2 sets - 1x daily - 7x weekly  · Sidelying Ankle Inversion with Ankle Weight - 10 reps - 2 sets - 1x daily - 7x weekly  · Sidelying Ankle Eversion Strengthening with Ankle Weight - 10 reps - 2 sets - 1x daily - 7x weekly  · Seated Toe Towel Scrunches - 10 reps - 2 sets - 2 hold - 1x daily - 7x weekly  · Seated Heel Slide - 10 reps - 2 sets - 5 hold - 1x daily - 7x weekly  · Seated Heel Raise - 10 reps - 2 sets - 1x daily - 7x weekly  · Isometric Ankle Inversion - 10 reps - 1 sets - 5 hold - 1x daily - 7x weekly  · Long Sitting Isometric Ankle Eversion in Plantar Flexion with Ball at Wall - 10 reps - 1 sets - 5 hold - 1x daily - 7x weekly

## 2020-09-17 ENCOUNTER — OFFICE VISIT (OUTPATIENT)
Dept: PHYSICAL THERAPY | Facility: CLINIC | Age: 35
End: 2020-09-17
Payer: COMMERCIAL

## 2020-09-17 DIAGNOSIS — S93.402D SPRAIN OF LEFT ANKLE, UNSPECIFIED LIGAMENT, SUBSEQUENT ENCOUNTER: Primary | ICD-10-CM

## 2020-09-17 PROCEDURE — 97110 THERAPEUTIC EXERCISES: CPT | Performed by: PHYSICAL THERAPIST

## 2020-09-17 PROCEDURE — 97112 NEUROMUSCULAR REEDUCATION: CPT | Performed by: PHYSICAL THERAPIST

## 2020-09-17 NOTE — PROGRESS NOTES
Daily Note     Today's date: 2020  Patient name: Yoko Anaya  : 1985  MRN: [id number]  Referring provider: Damon Garcia MD  Dx:   Encounter Diagnosis     ICD-10-CM    1  Sprain of left ankle, unspecified ligament, subsequent encounter  S96 944D                   Subjective: Pt reported feeling well and she noticed the swelling has gone down significantly  She reported feeling little to no pain during the day 1/10 but that pushing her foot into her shoe provided some irritation 2/10  She stated that she needs to watch how she steps at night due to the uneven ground and fatigue to prevent rolling her ankle  Pt feel as though at the end of the day her ankle is going to give out  Pt said she has regained about 85% of her PLOF  Objective: See treatment diary below      Assessment: Tolerated treatment well  Patient would benefit from continued PT and from continued ankle proprioceptive exercises in order to address her "instability" when fatigued and her fear of rolling her ankle out  Ankle inversion>eversion strenghtening exercises will be continued to increase stability of the ankle in order to address the pts concerns  Potential D/C on 10/1/2020 was discussed with the pt which reported feeling well and having regained 85% of her PLOF  pt is progressing well toward goals as per original POC  Plan: Continue per plan of care        Precautions: Standard  SOC: 2020  POC expiration: 10/28/2020  FOTO: 9/10/2020  RE:   Daily Treament Log:  Date 9/15/20 2020      Visit # 11 12      Manuals  5'      ktape L Tib posterior  tib post and ATFL applied  tib post and ATFL applied       MMT/palp/ROM                Ther Ex 30' 20'      Ankle AROM        Ankle circles 2# 20x cw/ccw   2# 20x cw/ccw               TB B/L Ever W/ ball red 2x10  W/ ball red TB 2x10       TB L inver grn 2x10  grn 2x10       Towel crunch sit        Sit heel slide for CC DF        Stand HR 20x   20x      S/L Inv/ever 1# 20x each        Prone PF knee flexed        Stand gastroc stretch 15"x5 L Slant board   15"x5 L       Straddle step ups 8" step 2x10 R/L 8"step 2x10 R/L       bike L2x5' L4x5'      HEP        NMRed 25' 20'      Alt 3 cone taps  1x10 R/L  From foam       Stand WB AROM PB/ML L2 BAPS split stance 2x10 cw/ccw  L2 BAPS split stance 2x10 cw/ccw       WB bal AP 1' AP/ML  1' AP/ML      High VerbalizeIt         Tandem walk W/ alt cone taps 10 cones x2  W/ alt cone taps 8 cones x3       FSU w/ hi knee 6"+2" foam 2x10 R/L 6"+ 2" foam 2x10 R/L       SLS L  15" x 4 L 15"x4 L      Modalities        CP 6'               Access Code: [email or web address]   URL: [employer]/   Date: 08/11/2020   Prepared by:  Bowen Williamson      Exercises  · Long Sitting Calf Stretch with Strap - 5 reps - 1 sets - 20 hold - 1x daily - 7x weekly  · Supine Ankle Circles - 10 reps - 2 sets - 1x daily - 7x weekly  · Sidelying Ankle Inversion with Ankle Weight - 10 reps - 2 sets - 1x daily - 7x weekly  · Sidelying Ankle Eversion Strengthening with Ankle Weight - 10 reps - 2 sets - 1x daily - 7x weekly  · Seated Toe Towel Scrunches - 10 reps - 2 sets - 2 hold - 1x daily - 7x weekly  · Seated Heel Slide - 10 reps - 2 sets - 5 hold - 1x daily - 7x weekly  · Seated Heel Raise - 10 reps - 2 sets - 1x daily - 7x weekly  · Isometric Ankle Inversion - 10 reps - 1 sets - 5 hold - 1x daily - 7x weekly  · Long Sitting Isometric Ankle Eversion in Plantar Flexion with Ball at Wall - 10 reps - 1 sets - 5 hold - 1x daily - 7x weekly

## 2020-09-22 ENCOUNTER — OFFICE VISIT (OUTPATIENT)
Dept: PHYSICAL THERAPY | Facility: CLINIC | Age: 35
End: 2020-09-22
Payer: COMMERCIAL

## 2020-09-22 DIAGNOSIS — S93.402D SPRAIN OF LEFT ANKLE, UNSPECIFIED LIGAMENT, SUBSEQUENT ENCOUNTER: Primary | ICD-10-CM

## 2020-09-22 PROCEDURE — 97110 THERAPEUTIC EXERCISES: CPT | Performed by: PHYSICAL THERAPIST

## 2020-09-22 PROCEDURE — 97112 NEUROMUSCULAR REEDUCATION: CPT | Performed by: PHYSICAL THERAPIST

## 2020-09-22 NOTE — PROGRESS NOTES
Daily Note     Today's date: 2020  Patient name: Coty Clifton  : 1985  MRN: [id number]  Referring provider: Choco Cuevas MD  Dx:   Encounter Diagnosis     ICD-10-CM    1  Sprain of left ankle, unspecified ligament, subsequent encounter  O94 572Q                   Subjective: PT reports she is doing really well  Her foot did swell by the end of the day after her last PT visit, but was not painful  She feels she doesn't really need the tape anymore and is not sore w/ recent progressions in PT  Objective: See treatment diary below      Assessment: Tolerated treatment well  Patient demonstrated fatigue post treatment and improving tolerance to WB activity in the clinic  She will be ready for D/C planned for 10/1/2020      Plan: Progress treatment as tolerated     Progress agility ladder     Precautions: Standard  SOC: 2020  POC expiration: 10/28/2020  FOTO: 9/10/2020  RE:   Daily Treament Log:  Date 9/15/20 2020 2020     Visit # 11 12 13     Manuals  5'      ktape L Tib posterior  tib post and ATFL applied  tib post and ATFL applied  Defers - no need     MMT/palp/ROM                Ther Ex 30' 20' 20'     Ankle AROM        Ankle circles 2# 20x cw/ccw   2# 20x cw/ccw  3# 20x cw/ccw             TB B/L Ever W/ ball red 2x10  W/ ball red TB 2x10  L uni blue 2x15     TB L inver grn 2x10  grn 2x10  Blue 2x15     Stand HR 20x   20x Off step      S/L Inv/ever 1# 20x each        Prone PF knee flexed   5# 2x12     Stand gastroc stretch 15"x5 L Slant board   15"x5 L  20"x3 L     Straddle step ups 8" step 2x10 R/L 8"step 2x10 R/L  8" 2x12 R/L     bike L2x5' L4x5' L4x6'     HEP        NMRed 25' 20' 20'     Alt 3 cone taps  1x10 R/L  From foam       Stand WB AROM PB/ML L2 BAPS split stance 2x10 cw/ccw  L2 BAPS split stance 2x10 cw/ccw  Lx BAPS split stance 2x10 cw/ccw     WB bal AP 1' AP/ML  1' AP/ML 1' AP/ML     High march ladder    3 laps     Tandem walk W/ alt cone taps 10 cones x2  W/ alt cone taps 8 cones x3  wl alt 8 cone taps; 2x2      FSU w/ hi knee 6"+2" foam 2x10 R/L 6"+ 2" foam 2x10 R/L  6"+2" foam 2x10 R/L     SLS L  15" x 4 L 15"x4 L      Ladder in/in/out/out        Ladder icky shuffle                Modalities        CP 6'               Access Code: [email or web address]   URL: [employer]/   Date: 08/11/2020   Prepared by:  Rima Mathew      Exercises  · Long Sitting Calf Stretch with Strap - 5 reps - 1 sets - 20 hold - 1x daily - 7x weekly  · Supine Ankle Circles - 10 reps - 2 sets - 1x daily - 7x weekly  · Sidelying Ankle Inversion with Ankle Weight - 10 reps - 2 sets - 1x daily - 7x weekly  · Sidelying Ankle Eversion Strengthening with Ankle Weight - 10 reps - 2 sets - 1x daily - 7x weekly  · Seated Toe Towel Scrunches - 10 reps - 2 sets - 2 hold - 1x daily - 7x weekly  · Seated Heel Slide - 10 reps - 2 sets - 5 hold - 1x daily - 7x weekly  · Seated Heel Raise - 10 reps - 2 sets - 1x daily - 7x weekly  · Isometric Ankle Inversion - 10 reps - 1 sets - 5 hold - 1x daily - 7x weekly  · Long Sitting Isometric Ankle Eversion in Plantar Flexion with Ball at Wall - 10 reps - 1 sets - 5 hold - 1x daily - 7x weekly

## 2020-09-24 ENCOUNTER — OFFICE VISIT (OUTPATIENT)
Dept: PHYSICAL THERAPY | Facility: CLINIC | Age: 35
End: 2020-09-24
Payer: COMMERCIAL

## 2020-09-24 DIAGNOSIS — S93.402D SPRAIN OF LEFT ANKLE, UNSPECIFIED LIGAMENT, SUBSEQUENT ENCOUNTER: Primary | ICD-10-CM

## 2020-09-24 DIAGNOSIS — S93.402A SPRAIN OF LEFT ANKLE, UNSPECIFIED LIGAMENT, INITIAL ENCOUNTER: ICD-10-CM

## 2020-09-24 PROCEDURE — 97112 NEUROMUSCULAR REEDUCATION: CPT

## 2020-09-24 PROCEDURE — 97110 THERAPEUTIC EXERCISES: CPT

## 2020-09-24 NOTE — PROGRESS NOTES
Daily Note     Today's date: 2020  Patient name: Lillian Rollins  : 1985  MRN: [id number]  Referring provider: Ariel Brown MD  Dx:   Encounter Diagnosis     ICD-10-CM    1  Sprain of left ankle, unspecified ligament, subsequent encounter  S93 402D    2  Sprain of left ankle, unspecified ligament, initial encounter  S93 402A                   Subjective: Pt reports her ankle has not been bothering her, noticed some swelling near her lateral malleoli  Has not had any pain over the last couple weeks, gets soreness when pushing down to put her shoe on  Objective: See treatment diary below      Assessment: Tolerated treatment well  Patient exhibited good technique with therapeutic exercises Progressions in strengthening today with no difficulties, pt with improvements in standing tolerance and dynamic ankle stability during SLS activities  Cont with dynamic ankle strengthening as tolerated  Plan: Continue per plan of care        Precautions: Standard  SOC: 2020  POC expiration: 10/28/2020  FOTO: 9/10/2020  RE:   Daily Treament Log:  Date 9/15/20 2020 2020 2020    Visit # 11 12 13 14    Manuals  5'      ktape L Tib posterior  tib post and ATFL applied  tib post and ATFL applied  Defers - no need     MMT/palp/ROM                Ther Ex 30' 20' 20' 25'    Ankle AROM        Ankle circles 2# 20x cw/ccw   2# 20x cw/ccw  3# 20x cw/ccw 3# 20x cw/ccw     Leg press     B/L 50# 2x10  Uni 20# 2x10     SL heel raise Leg press     L 35# 2x10     TB B/L Ever W/ ball red 2x10  W/ ball red TB 2x10  L uni blue 2x15 L uni blue 2x15    TB L inver grn 2x10  grn 2x10  Blue 2x15 Blue 2x15     Stand HR 20x   20x Off step  Off step 2x10     S/L Inv/ever 1# 20x each        Prone PF knee flexed   5# 2x12     Stand gastroc stretch 15"x5 L Slant board   15"x5 L  20"x3 L 20"x5 R/L     Straddle step ups 8" step 2x10 R/L 8"step 2x10 R/L  8" 2x12 R/L 8" 2x12 R/L     bike L2x5' L4x5' L4x6' L4x6' HEP        NMRed 25' 20' 20' 20'    Alt 3 cone taps  1x10 R/L  From foam   From biodex foam 1x10 R/L     Stand WB AROM PB/ML L2 BAPS split stance 2x10 cw/ccw  L2 BAPS split stance 2x10 cw/ccw  Lx BAPS split stance 2x10 cw/ccw L2 BAPS split stance 2x10 cw/ccw    WB bal AP 1' AP/ML  1' AP/ML 1' AP/ML 1' AP/ML     High march ladder    3 laps     Tandem walk W/ alt cone taps 10 cones x2  W/ alt cone taps 8 cones x3  w/ alt 8 cone taps; 2x2  w/ alt 8 cone taps 2laps     FSU w/ hi knee 6"+2" foam 2x10 R/L 6"+ 2" foam 2x10 R/L  6"+2" foam 2x10 R/L 6"+2" foam 2x10 R/L     SLS L  15" x 4 L 15"x4 L  15"x4 R/L    Ladder in/in/out/out        Ladder icky shuffle                Modalities        CP 6'               Access Code: [email or web address]   URL: [email or web address]   Date: 08/11/2020   Prepared by:  Jennifer Sherwood      Exercises  · Long Sitting Calf Stretch with Strap - 5 reps - 1 sets - 20 hold - 1x daily - 7x weekly  · Supine Ankle Circles - 10 reps - 2 sets - 1x daily - 7x weekly  · Sidelying Ankle Inversion with Ankle Weight - 10 reps - 2 sets - 1x daily - 7x weekly  · Sidelying Ankle Eversion Strengthening with Ankle Weight - 10 reps - 2 sets - 1x daily - 7x weekly  · Seated Toe Towel Scrunches - 10 reps - 2 sets - 2 hold - 1x daily - 7x weekly  · Seated Heel Slide - 10 reps - 2 sets - 5 hold - 1x daily - 7x weekly  · Seated Heel Raise - 10 reps - 2 sets - 1x daily - 7x weekly  · Isometric Ankle Inversion - 10 reps - 1 sets - 5 hold - 1x daily - 7x weekly  · Long Sitting Isometric Ankle Eversion in Plantar Flexion with Ball at Wall - 10 reps - 1 sets - 5 hold - 1x daily - 7x weekly

## 2020-09-29 ENCOUNTER — OFFICE VISIT (OUTPATIENT)
Dept: PHYSICAL THERAPY | Facility: CLINIC | Age: 35
End: 2020-09-29
Payer: COMMERCIAL

## 2020-09-29 DIAGNOSIS — S93.402D SPRAIN OF LEFT ANKLE, UNSPECIFIED LIGAMENT, SUBSEQUENT ENCOUNTER: Primary | ICD-10-CM

## 2020-09-29 DIAGNOSIS — S93.402A SPRAIN OF LEFT ANKLE, UNSPECIFIED LIGAMENT, INITIAL ENCOUNTER: ICD-10-CM

## 2020-09-29 PROCEDURE — 97110 THERAPEUTIC EXERCISES: CPT | Performed by: PHYSICAL THERAPIST

## 2020-09-29 PROCEDURE — 97112 NEUROMUSCULAR REEDUCATION: CPT | Performed by: PHYSICAL THERAPIST

## 2020-09-29 NOTE — PROGRESS NOTES
Daily Note     Today's date: 2020  Patient name: Caitlin Torrez  : 1985  MRN: [id number]  Referring provider: Leann Malcolm MD  Dx:   Encounter Diagnosis     ICD-10-CM    1  Sprain of left ankle, unspecified ligament, subsequent encounter  S93 402D    2  Sprain of left ankle, unspecified ligament, initial encounter  S93 402A                   Subjective: pt reported feeling well  Last week she had noticed decreased swelling on both sides of her L ankle but on monday she noticed swelling on both sides of her ankle with no subsequent pain  She reports no  ADL/ recreational activity limitations due to her L ankle and she was able to put her L shoe on this morning (which she had difficulty doing before)   She reports no pain 0/10 at the start of session  Objective: See treatment diary below      Assessment: Tolerated treatment well  Patient would benefit from continued PT  Pt's balance has improved and she would benefit for more endurance activities  Introduction of plyometric on the LP done today which was tolerated well by the pt with no increase in pain or discomfort after performing  Will progress to more plyometrics activities next session  Pt no longer experiences activity limitations due to her L ankle  [place] was reintroduced today due to the increased swelling in the pts ankle  Pt is progressing well towards goals as per original POC  Plan: Continue per plan of care        Precautions: Standard  SOC: 2020  POC expiration: 10/28/2020  FOTO: 9/10/2020  RE:   Daily Treament Log:  Date 9/15/20 2020 2020 2020 2020   Visit # 11 12 13 14 15   Manuals  5'      ktape L Tib posterior  tib post and ATFL applied  tib post and ATFL applied  Defers - no need  tib post and ATFL applied   MMT/palp/ROM                Ther Ex 30' 20' 20' 25' 25'   Ankle AROM        Ankle circles 2# 20x cw/ccw   2# 20x cw/ccw  3# 20x cw/ccw 3# 20x cw/ccw     Leg press     B/L 50# 2x10  Uni 20# 2x10  B/L 50# 2x12  Uni 20# 2x12   SL heel raise Leg press     L 35# 2x10  L 35# 2x12    LP plyometrics      B/L jumps 25# 2x12   TB B/L Ever W/ ball red 2x10  W/ ball red TB 2x10  L uni blue 2x15 L uni blue 2x15    TB L inver grn 2x10  grn 2x10  Blue 2x15 Blue 2x15     Stand HR 20x   20x Off step  Off step 2x10  Off step 2x10    S/L Inv/ever 1# 20x each        Prone PF knee flexed   5# 2x12     Stand gastroc stretch 15"x5 L Slant board   15"x5 L  20"x3 L 20"x5 R/L  20"x5 R/L    Straddle step ups 8" step 2x10 R/L 8"step 2x10 R/L  8" 2x12 R/L 8" 2x12 R/L  8" 2x12 R/L    bike L2x5' L4x5' L4x6' L4x6' L4 x 6'    HEP        NMRed 25' 20' 20' 20' 20'    Alt 3 cone taps  1x10 R/L  From foam   From biodex foam 1x10 R/L  From biodex foam 1x10 R/L    Stand WB AROM PB/ML L2 BAPS split stance 2x10 cw/ccw  L2 BAPS split stance 2x10 cw/ccw  Lx BAPS split stance 2x10 cw/ccw L2 BAPS split stance 2x10 cw/ccw L2 BAPS split stance 2x10 cw/ccw   WB bal AP 1' AP/ML  1' AP/ML 1' AP/ML 1' AP/ML  1' AP/ ML    High march ladder    3 laps     Tandem walk W/ alt cone taps 10 cones x2  W/ alt cone taps 8 cones x3  w/ alt 8 cone taps; 2x2  w/ alt 8 cone taps 2laps  W/ alt 8 cone taps 2 laps    FSU w/ hi knee 6"+2" foam 2x10 R/L 6"+ 2" foam 2x10 R/L  6"+2" foam 2x10 R/L 6"+2" foam 2x10 R/L  6"+2" foam 2x10 R/L    SLS  15" x 4 L 15"x4 L  15"x4 R/L 15" x4 R/L    Ladder in/in/out/out        Ladder icky shuffle                Modalities        CP 6'               Access Code: [email or web address]   URL: [email or web address]   Date: 08/11/2020   Prepared by:  Dianelys Aguiar      Exercises  · Long Sitting Calf Stretch with Strap - 5 reps - 1 sets - 20 hold - 1x daily - 7x weekly  · Supine Ankle Circles - 10 reps - 2 sets - 1x daily - 7x weekly  · Sidelying Ankle Inversion with Ankle Weight - 10 reps - 2 sets - 1x daily - 7x weekly  · Sidelying Ankle Eversion Strengthening with Ankle Weight - 10 reps - 2 sets - 1x daily - 7x weekly  · Seated Toe Towel Scrunches - 10 reps - 2 sets - 2 hold - 1x daily - 7x weekly  · Seated Heel Slide - 10 reps - 2 sets - 5 hold - 1x daily - 7x weekly  · Seated Heel Raise - 10 reps - 2 sets - 1x daily - 7x weekly  · Isometric Ankle Inversion - 10 reps - 1 sets - 5 hold - 1x daily - 7x weekly  · Long Sitting Isometric Ankle Eversion in Plantar Flexion with Ball at [address] 10 reps - 1 sets - 5 hold - 1x daily - 7x weekly

## 2020-10-01 ENCOUNTER — OFFICE VISIT (OUTPATIENT)
Dept: PHYSICAL THERAPY | Facility: CLINIC | Age: 35
End: 2020-10-01
Payer: COMMERCIAL

## 2020-10-01 DIAGNOSIS — S93.402D SPRAIN OF LEFT ANKLE, UNSPECIFIED LIGAMENT, SUBSEQUENT ENCOUNTER: Primary | ICD-10-CM

## 2020-10-01 DIAGNOSIS — S93.402A SPRAIN OF LEFT ANKLE, UNSPECIFIED LIGAMENT, INITIAL ENCOUNTER: ICD-10-CM

## 2020-10-01 PROCEDURE — 97112 NEUROMUSCULAR REEDUCATION: CPT | Performed by: PHYSICAL THERAPIST

## 2020-10-01 PROCEDURE — 97110 THERAPEUTIC EXERCISES: CPT | Performed by: PHYSICAL THERAPIST

## 2021-03-16 ENCOUNTER — HOSPITAL ENCOUNTER (EMERGENCY)
Facility: HOSPITAL | Age: 36
Discharge: HOME | End: 2021-03-16
Attending: EMERGENCY MEDICINE | Admitting: EMERGENCY MEDICINE
Payer: COMMERCIAL

## 2021-03-16 VITALS
RESPIRATION RATE: 20 BRPM | BODY MASS INDEX: 24.52 KG/M2 | OXYGEN SATURATION: 100 % | WEIGHT: 138.4 LBS | SYSTOLIC BLOOD PRESSURE: 156 MMHG | DIASTOLIC BLOOD PRESSURE: 85 MMHG | HEART RATE: 86 BPM | TEMPERATURE: 97.4 F

## 2021-03-16 DIAGNOSIS — H60.90 OTITIS EXTERNA: Primary | ICD-10-CM

## 2021-03-16 PROCEDURE — 99284 EMERGENCY DEPT VISIT MOD MDM: CPT | Performed by: EMERGENCY MEDICINE

## 2021-03-16 PROCEDURE — 99282 EMERGENCY DEPT VISIT SF MDM: CPT

## 2021-03-16 RX ORDER — NEOMYCIN SULFATE, POLYMYXIN B SULFATE AND HYDROCORTISONE 10; 3.5; 1 MG/ML; MG/ML; [USP'U]/ML
4 SUSPENSION/ DROPS AURICULAR (OTIC) ONCE
Status: COMPLETED | OUTPATIENT
Start: 2021-03-16 | End: 2021-03-16

## 2021-03-16 RX ADMIN — NEOMYCIN SULFATE, POLYMYXIN B SULFATE AND HYDROCORTISONE 4 DROP: 10; 3.5; 1 SUSPENSION/ DROPS AURICULAR (OTIC) at 13:13

## 2021-03-16 NOTE — Clinical Note
Briana Vera was seen and treated in our emergency department on 3/16/2021  Diagnosis:     Elsa Almanzar  may return to work on return date  She may return on this date: 03/18/2021         If you have any questions or concerns, please don't hesitate to call        Melo Preston DO    ______________________________           _______________          _______________  Hospital Representative                              Date                                Time

## 2021-03-16 NOTE — ED PROVIDER NOTES
History  Chief Complaint   Patient presents with    Ear Fullness     States her right ear feels swollen and feeling something is in it   States pressure      51-year-old female presents stating that her right ear is swollen shut and sore  No fevers chills nausea vomiting diarrhea does have tenderness swelling around the ear and when you move on the ear  No other complaints      History provided by:  Patient   used: No        Prior to Admission Medications   Prescriptions Last Dose Informant Patient Reported? Taking? Etonogestrel (NEXPLANON SC)   Yes No   Sig: Inject under the skin   levothyroxine 100 mcg tablet   Yes No   Sig: Take 100 mcg by mouth daily      Facility-Administered Medications: None       Past Medical History:   Diagnosis Date    Environmental allergies     Ovarian cyst     Seasonal allergies        Past Surgical History:   Procedure Laterality Date     SECTION         Family History   Problem Relation Age of Onset    Arthritis Mother     No Known Problems Father      I have reviewed and agree with the history as documented  E-Cigarette/Vaping    E-Cigarette Use Never User      E-Cigarette/Vaping Substances    Nicotine No     THC No     CBD No     Flavoring No     Other No     Unknown No      Social History     Tobacco Use    Smoking status: Current Every Day Smoker     Packs/day: 0 50    Smokeless tobacco: Never Used   Substance Use Topics    Alcohol use: Yes     Frequency: Never     Comment: occassional    Drug use: Yes     Frequency: 1 0 times per week     Types: Marijuana       Review of Systems   Constitutional: Negative for activity change, chills, diaphoresis and fever  HENT: Positive for ear pain and hearing loss  Negative for congestion, nosebleeds, sore throat, trouble swallowing and voice change  Eyes: Negative for pain, discharge and redness     Respiratory: Negative for apnea, cough, choking, shortness of breath, wheezing and stridor  Cardiovascular: Negative for chest pain and palpitations  Gastrointestinal: Negative for abdominal distention, abdominal pain, constipation, diarrhea, nausea and vomiting  Endocrine: Negative for polydipsia  Genitourinary: Negative for difficulty urinating, dysuria, flank pain, frequency, hematuria and urgency  Musculoskeletal: Negative for back pain, gait problem, joint swelling, myalgias, neck pain and neck stiffness  Skin: Negative for pallor and rash  Neurological: Negative for dizziness, tremors, syncope, speech difficulty, weakness, numbness and headaches  Hematological: Negative for adenopathy  Psychiatric/Behavioral: Negative for confusion, hallucinations, self-injury and suicidal ideas  The patient is not nervous/anxious  Physical Exam  Physical Exam  Vitals signs and nursing note reviewed  Constitutional:       General: She is not in acute distress  Appearance: She is well-developed  She is not diaphoretic  HENT:      Head: Normocephalic and atraumatic  Left Ear: External ear normal       Ears:      Comments: Right external canal almost completely swollen shut tenderness pulling on the tragus  Consistent with otitis externa     Nose: Nose normal    Eyes:      Conjunctiva/sclera: Conjunctivae normal       Pupils: Pupils are equal, round, and reactive to light  Neck:      Musculoskeletal: Normal range of motion and neck supple  Cardiovascular:      Rate and Rhythm: Normal rate and regular rhythm  Heart sounds: Normal heart sounds  Pulmonary:      Effort: Pulmonary effort is normal       Breath sounds: Normal breath sounds  Abdominal:      General: Bowel sounds are normal       Palpations: Abdomen is soft  Musculoskeletal: Normal range of motion  Skin:     General: Skin is warm and dry  Neurological:      Mental Status: She is alert and oriented to person, place, and time  Deep Tendon Reflexes: Reflexes are normal and symmetric  Psychiatric:         Behavior: Behavior is cooperative  Vital Signs  ED Triage Vitals [03/16/21 1237]   Temperature Pulse Respirations Blood Pressure SpO2   (!) 97 4 °F (36 3 °C) 86 20 156/85 100 %      Temp Source Heart Rate Source Patient Position - Orthostatic VS BP Location FiO2 (%)   Tympanic Monitor Sitting Left arm --      Pain Score       Worst Possible Pain           Vitals:    03/16/21 1237   BP: 156/85   Pulse: 86   Patient Position - Orthostatic VS: Sitting         Visual Acuity      ED Medications  Medications   neomycin-polymyxin-hydrocortisone (CORTISPORIN) 0 35%-10,000 units/mL-1% otic suspension 4 drop (has no administration in time range)       Diagnostic Studies  Results Reviewed     None                 No orders to display              Procedures  Procedures         ED Course                                           MDM    Disposition  Final diagnoses:   Otitis externa     Time reflects when diagnosis was documented in both MDM as applicable and the Disposition within this note     Time User Action Codes Description Comment    3/16/2021  1:02 PM Deepti Dover Add [H60 90] Otitis externa       ED Disposition     ED Disposition Condition Date/Time Comment    Discharge Stable Tue Mar 16, 2021  1:02 PM Emerson Moore discharge to home/self care  Follow-up Information     Follow up With Specialties Details Why Contact Info    Julian Gambino MD Otolaryngology Schedule an appointment as soon as possible for a visit  As needed [address]   [phone]            Patient's Medications   Discharge Prescriptions    No medications on file     No discharge procedures on file      PDMP Review     None          ED Provider  Electronically Signed by           Galen Camejo DO  03/16/21 8056

## 2022-08-17 ENCOUNTER — HOSPITAL ENCOUNTER (EMERGENCY)
Facility: HOSPITAL | Age: 37
Discharge: HOME | End: 2022-08-18
Attending: EMERGENCY MEDICINE
Payer: COMMERCIAL

## 2022-08-17 DIAGNOSIS — L02.414: Primary | ICD-10-CM

## 2022-08-17 PROCEDURE — 99284 EMERGENCY DEPT VISIT MOD MDM: CPT | Performed by: EMERGENCY MEDICINE

## 2022-08-17 PROCEDURE — 10061 I&D ABSCESS COMP/MULTIPLE: CPT | Performed by: EMERGENCY MEDICINE

## 2022-08-17 PROCEDURE — 99283 EMERGENCY DEPT VISIT LOW MDM: CPT

## 2022-08-17 RX ORDER — SULFAMETHOXAZOLE AND TRIMETHOPRIM 800; 160 MG/1; MG/1
1 TABLET ORAL 2 TIMES DAILY
Qty: 14 TABLET | Refills: 0 | Status: SHIPPED | OUTPATIENT
Start: 2022-08-17 | End: 2022-08-24

## 2022-08-17 RX ORDER — LIDOCAINE HYDROCHLORIDE AND EPINEPHRINE 10; 10 MG/ML; UG/ML
20 INJECTION, SOLUTION INFILTRATION; PERINEURAL ONCE
Status: COMPLETED | OUTPATIENT
Start: 2022-08-17 | End: 2022-08-17

## 2022-08-17 RX ORDER — SULFAMETHOXAZOLE AND TRIMETHOPRIM 800; 160 MG/1; MG/1
1 TABLET ORAL ONCE
Status: COMPLETED | OUTPATIENT
Start: 2022-08-18 | End: 2022-08-18

## 2022-08-17 RX ADMIN — LIDOCAINE HYDROCHLORIDE,EPINEPHRINE BITARTRATE 20 ML: 10; .01 INJECTION, SOLUTION INFILTRATION; PERINEURAL at 23:11

## 2022-08-18 VITALS
OXYGEN SATURATION: 97 % | WEIGHT: 138.45 LBS | HEART RATE: 88 BPM | SYSTOLIC BLOOD PRESSURE: 150 MMHG | DIASTOLIC BLOOD PRESSURE: 90 MMHG | TEMPERATURE: 98.5 F | BODY MASS INDEX: 24.53 KG/M2 | RESPIRATION RATE: 16 BRPM

## 2022-08-18 RX ADMIN — SULFAMETHOXAZOLE AND TRIMETHOPRIM 1 TABLET: 800; 160 TABLET ORAL at 00:00

## 2022-08-18 NOTE — ED PROVIDER NOTES
History  Chief Complaint   Patient presents with    Wrist Injury     States about a week ago possibly got splinters in her L wrist when reaching under a bunk bed with particle board underneath  Now infected, red swollen and pain going up arm     Patient is a 71-year-old female  Tetanus vaccination is up-to-date  She is not a diabetic  She was reaching under bed walk camping about a week ago and got splinters in her left ventral wrist   She was able to remove most of the splinters  However, she is not sure she removed them all  One day ago she developed swelling with purulence to the left ventral wrist   No fever  No associated motor or sensory complaints  Prior to Admission Medications   Prescriptions Last Dose Informant Patient Reported? Taking? Etonogestrel (NEXPLANON SC)   Yes No   Sig: Inject under the skin   levothyroxine 100 mcg tablet   Yes No   Sig: Take 125 mcg by mouth daily      Facility-Administered Medications: None       Past Medical History:   Diagnosis Date    Environmental allergies     Ovarian cyst     Seasonal allergies        Past Surgical History:   Procedure Laterality Date     SECTION         Family History   Problem Relation Age of Onset    Arthritis Mother     No Known Problems Father      I have reviewed and agree with the history as documented  E-Cigarette/Vaping    E-Cigarette Use Never User      E-Cigarette/Vaping Substances    Nicotine No     THC No     CBD No     Flavoring No     Other No     Unknown No      Social History     Tobacco Use    Smoking status: Current Every Day Smoker     Packs/day: 0 50    Smokeless tobacco: Never Used   Vaping Use    Vaping Use: Never used   Substance Use Topics    Alcohol use: Yes     Comment: occassional    Drug use: Yes     Types: Marijuana       Review of Systems   Constitutional: Negative for chills and fever  Skin: Positive for wound  Negative for pallor     Neurological: Negative for weakness and numbness  All other systems reviewed and are negative  Physical Exam  Physical Exam  Vitals reviewed  Constitutional:       General: She is not in acute distress  HENT:      Head: Normocephalic and atraumatic  Nose: Nose normal       Mouth/Throat:      Mouth: Mucous membranes are moist    Eyes:      General:         Right eye: No discharge  Left eye: No discharge  Conjunctiva/sclera: Conjunctivae normal    Pulmonary:      Effort: Pulmonary effort is normal  No respiratory distress  Musculoskeletal:         General: Swelling and tenderness present  No deformity  Normal range of motion  Cervical back: Normal range of motion and neck supple  Comments: Neurovascular exam is normal in the left hand   Skin:     General: Skin is warm and dry  Findings: Erythema present  Comments: There is an erythematous mass to the thenar aspect of the left wrist   There is a central area purulence/fibrinous exudate  There is fluctuance  Neurological:      General: No focal deficit present  Mental Status: She is alert and oriented to person, place, and time     Psychiatric:         Mood and Affect: Mood normal          Behavior: Behavior normal          Vital Signs  ED Triage Vitals [08/17/22 2237]   Temperature Pulse Respirations Blood Pressure SpO2   98 5 °F (36 9 °C) 103 16 150/90 97 %      Temp Source Heart Rate Source Patient Position - Orthostatic VS BP Location FiO2 (%)   Tympanic Monitor Sitting Right arm --      Pain Score       8           Vitals:    08/17/22 2237   BP: 150/90   Pulse: 103   Patient Position - Orthostatic VS: Sitting         Visual Acuity      ED Medications  Medications   sulfamethoxazole-trimethoprim (BACTRIM DS) 800-160 mg per tablet 1 tablet (has no administration in time range)   lidocaine-epinephrine (XYLOCAINE/EPINEPHRINE) 1 %-1:100,000 injection 20 mL (20 mL Infiltration Given 8/17/22 2311)       Diagnostic Studies  Results Reviewed     None No orders to display              Procedures  Laceration repair    Date/Time: 8/17/2022 11:56 PM  Performed by: Dahiana Swan MD  Authorized by: Dahiana Swan MD       Procedure Details:  Comments: The wrist was prepped and draped in usual sterile manner  It was anesthetized using 1% lidocaine with epinephrine  Incision and drainage was performed  A very small amount of pus was returned  There was no foreign body  The wound was explored in de loculated  It was irrigated thoroughly  Patient tolerated procedure well  ED Course                               SBIRT 22yo+    Flowsheet Row Most Recent Value   SBIRT (25 yo +)    In order to provide better care to our patients, we are screening all of our patients for alcohol and drug use  Would it be okay to ask you these screening questions? No Filed at: 08/17/2022 2326                    MDM    Disposition  Final diagnoses:   Cutaneous abscess of left wrist     Time reflects when diagnosis was documented in both MDM as applicable and the Disposition within this note     Time User Action Codes Description Comment    8/17/2022 11:56 PM Charlene Vega Add [H91 240] Cutaneous abscess of left wrist       ED Disposition     ED Disposition   Discharge    Condition   Stable    Date/Time   Wed Aug 17, 2022 11:56 PM    Comment   Pattie Beltran discharge to home/self care                 Follow-up Information     Follow up With Specialties Details Why Contact Info    Robert Edwards,  Family Medicine In 2 days  [address]  [phone]            Patient's Medications   Discharge Prescriptions    SULFAMETHOXAZOLE-TRIMETHOPRIM (BACTRIM DS) 800-160 MG PER TABLET    Take 1 tablet by mouth 2 (two) times a day for 7 days smx-tmp DS (BACTRIM) 800-160 mg tabs (1tab q12 D10)       Start Date: 8/17/2022 End Date: 8/24/2022       Order Dose: 1 tablet       Quantity: 14 tablet    Refills: 0       No discharge procedures on file     PDMP Review     None          ED Provider  Electronically Signed by           Kristen Collins MD  08/17/22 8448

## 2022-08-18 NOTE — ED NOTES
Clean gauze placed over wrist wound and site wrapped with gauze        [facility]  08/18/22 2046